# Patient Record
Sex: MALE | Race: WHITE | NOT HISPANIC OR LATINO | URBAN - METROPOLITAN AREA
[De-identification: names, ages, dates, MRNs, and addresses within clinical notes are randomized per-mention and may not be internally consistent; named-entity substitution may affect disease eponyms.]

---

## 2017-04-01 ENCOUNTER — INPATIENT (INPATIENT)
Facility: HOSPITAL | Age: 82
LOS: 3 days | Discharge: HOME | DRG: 66 | End: 2017-04-05
Payer: MEDICARE

## 2017-04-01 VITALS — HEIGHT: 70 IN | WEIGHT: 179.9 LBS

## 2017-04-01 DIAGNOSIS — I10 ESSENTIAL (PRIMARY) HYPERTENSION: ICD-10-CM

## 2017-04-01 DIAGNOSIS — G45.9 TRANSIENT CEREBRAL ISCHEMIC ATTACK, UNSPECIFIED: ICD-10-CM

## 2017-04-01 DIAGNOSIS — Z98.890 OTHER SPECIFIED POSTPROCEDURAL STATES: Chronic | ICD-10-CM

## 2017-04-01 DIAGNOSIS — G45.1 CAROTID ARTERY SYNDROME (HEMISPHERIC): ICD-10-CM

## 2017-04-01 DIAGNOSIS — Z95.5 PRESENCE OF CORONARY ANGIOPLASTY IMPLANT AND GRAFT: ICD-10-CM

## 2017-04-01 DIAGNOSIS — J45.909 UNSPECIFIED ASTHMA, UNCOMPLICATED: ICD-10-CM

## 2017-04-01 LAB
ALBUMIN SERPL ELPH-MCNC: 3.5 G/DL — SIGNIFICANT CHANGE UP (ref 3.3–5.2)
ALP SERPL-CCNC: 46 U/L — SIGNIFICANT CHANGE UP (ref 40–120)
ALT FLD-CCNC: 10 U/L — SIGNIFICANT CHANGE UP
ANION GAP SERPL CALC-SCNC: 7 MMOL/L — SIGNIFICANT CHANGE UP (ref 5–17)
APTT BLD: 32 SEC — SIGNIFICANT CHANGE UP (ref 27.5–37.4)
AST SERPL-CCNC: 18 U/L — SIGNIFICANT CHANGE UP
BASOPHILS # BLD AUTO: 0 K/UL — SIGNIFICANT CHANGE UP (ref 0–0.2)
BASOPHILS NFR BLD AUTO: 0.5 % — SIGNIFICANT CHANGE UP (ref 0–2)
BILIRUB SERPL-MCNC: 1.1 MG/DL — SIGNIFICANT CHANGE UP (ref 0.4–2)
BUN SERPL-MCNC: 18 MG/DL — SIGNIFICANT CHANGE UP (ref 8–20)
CALCIUM SERPL-MCNC: 8.6 MG/DL — SIGNIFICANT CHANGE UP (ref 8.6–10.2)
CHLORIDE SERPL-SCNC: 107 MMOL/L — SIGNIFICANT CHANGE UP (ref 98–107)
CO2 SERPL-SCNC: 30 MMOL/L — HIGH (ref 22–29)
CREAT SERPL-MCNC: 0.97 MG/DL — SIGNIFICANT CHANGE UP (ref 0.5–1.3)
EOSINOPHIL # BLD AUTO: 0.5 K/UL — SIGNIFICANT CHANGE UP (ref 0–0.5)
EOSINOPHIL NFR BLD AUTO: 7.7 % — HIGH (ref 0–5)
GLUCOSE SERPL-MCNC: 103 MG/DL — SIGNIFICANT CHANGE UP (ref 70–115)
HCT VFR BLD CALC: 42.6 % — SIGNIFICANT CHANGE UP (ref 42–52)
HGB BLD-MCNC: 14.7 G/DL — SIGNIFICANT CHANGE UP (ref 14–18)
INR BLD: 1.12 RATIO — SIGNIFICANT CHANGE UP (ref 0.88–1.16)
LYMPHOCYTES # BLD AUTO: 2 K/UL — SIGNIFICANT CHANGE UP (ref 1–4.8)
LYMPHOCYTES # BLD AUTO: 30 % — SIGNIFICANT CHANGE UP (ref 20–55)
MCHC RBC-ENTMCNC: 33.3 PG — HIGH (ref 27–31)
MCHC RBC-ENTMCNC: 34.5 G/DL — SIGNIFICANT CHANGE UP (ref 32–36)
MCV RBC AUTO: 96.6 FL — HIGH (ref 80–94)
MONOCYTES # BLD AUTO: 0.7 K/UL — SIGNIFICANT CHANGE UP (ref 0–0.8)
MONOCYTES NFR BLD AUTO: 10 % — SIGNIFICANT CHANGE UP (ref 3–10)
NEUTROPHILS # BLD AUTO: 3.4 K/UL — SIGNIFICANT CHANGE UP (ref 1.8–8)
NEUTROPHILS NFR BLD AUTO: 51.6 % — SIGNIFICANT CHANGE UP (ref 37–73)
PLATELET # BLD AUTO: 164 K/UL — SIGNIFICANT CHANGE UP (ref 150–400)
POTASSIUM SERPL-MCNC: 4.6 MMOL/L — SIGNIFICANT CHANGE UP (ref 3.5–5.3)
POTASSIUM SERPL-SCNC: 4.6 MMOL/L — SIGNIFICANT CHANGE UP (ref 3.5–5.3)
PROT SERPL-MCNC: 6.8 G/DL — SIGNIFICANT CHANGE UP (ref 6.6–8.7)
PROTHROM AB SERPL-ACNC: 12.4 SEC — SIGNIFICANT CHANGE UP (ref 9.8–12.7)
RBC # BLD: 4.41 M/UL — LOW (ref 4.6–6.2)
RBC # FLD: 13.2 % — SIGNIFICANT CHANGE UP (ref 11–15.6)
SODIUM SERPL-SCNC: 144 MMOL/L — SIGNIFICANT CHANGE UP (ref 135–145)
TROPONIN T SERPL-MCNC: <0.01 NG/ML — SIGNIFICANT CHANGE UP (ref 0–0.06)
WBC # BLD: 6.6 K/UL — SIGNIFICANT CHANGE UP (ref 4.8–10.8)
WBC # FLD AUTO: 6.6 K/UL — SIGNIFICANT CHANGE UP (ref 4.8–10.8)

## 2017-04-01 PROCEDURE — 99223 1ST HOSP IP/OBS HIGH 75: CPT | Mod: AI

## 2017-04-01 PROCEDURE — 99285 EMERGENCY DEPT VISIT HI MDM: CPT

## 2017-04-01 PROCEDURE — 93306 TTE W/DOPPLER COMPLETE: CPT | Mod: 26

## 2017-04-01 PROCEDURE — 93010 ELECTROCARDIOGRAM REPORT: CPT

## 2017-04-01 PROCEDURE — 70450 CT HEAD/BRAIN W/O DYE: CPT | Mod: 26

## 2017-04-01 RX ORDER — ENOXAPARIN SODIUM 100 MG/ML
40 INJECTION SUBCUTANEOUS EVERY 24 HOURS
Qty: 0 | Refills: 0 | Status: DISCONTINUED | OUTPATIENT
Start: 2017-04-01 | End: 2017-04-05

## 2017-04-01 RX ORDER — ASPIRIN/CALCIUM CARB/MAGNESIUM 324 MG
325 TABLET ORAL DAILY
Qty: 0 | Refills: 0 | Status: DISCONTINUED | OUTPATIENT
Start: 2017-04-01 | End: 2017-04-04

## 2017-04-01 RX ORDER — ACETAMINOPHEN 500 MG
650 TABLET ORAL EVERY 6 HOURS
Qty: 0 | Refills: 0 | Status: DISCONTINUED | OUTPATIENT
Start: 2017-04-01 | End: 2017-04-05

## 2017-04-01 RX ORDER — SIMVASTATIN 20 MG/1
40 TABLET, FILM COATED ORAL AT BEDTIME
Qty: 0 | Refills: 0 | Status: DISCONTINUED | OUTPATIENT
Start: 2017-04-01 | End: 2017-04-05

## 2017-04-01 RX ORDER — MONTELUKAST 4 MG/1
10 TABLET, CHEWABLE ORAL DAILY
Qty: 0 | Refills: 0 | Status: DISCONTINUED | OUTPATIENT
Start: 2017-04-01 | End: 2017-04-05

## 2017-04-01 RX ORDER — HYDRALAZINE HCL 50 MG
5 TABLET ORAL ONCE
Qty: 0 | Refills: 0 | Status: COMPLETED | OUTPATIENT
Start: 2017-04-01 | End: 2017-04-01

## 2017-04-01 RX ORDER — METOPROLOL TARTRATE 50 MG
25 TABLET ORAL DAILY
Qty: 0 | Refills: 0 | Status: DISCONTINUED | OUTPATIENT
Start: 2017-04-01 | End: 2017-04-03

## 2017-04-01 RX ADMIN — SIMVASTATIN 40 MILLIGRAM(S): 20 TABLET, FILM COATED ORAL at 22:19

## 2017-04-01 RX ADMIN — Medication 5 MILLIGRAM(S): at 19:55

## 2017-04-01 NOTE — H&P ADULT - ASSESSMENT
83 yr old male with CAD s/p stent, hypertension, carotid endarterectomy admitted with left arm weakness and word finding difficulty, CT brain no acute stroke. Labs noted. 83 yr old male with CAD s/p stent, hypertension, carotid endarterectomy admitted with right arm weakness and word finding difficulty, CT brain no acute stroke. Labs noted.

## 2017-04-01 NOTE — ED PROVIDER NOTE - NS ED ROS FT
Review of Systems:  	•	CONSTITUTIONAL : no fever or weight change  	•	SKIN : no rash  	•	HEMATOLOGIC : no petechia, no bruising  	•	EYES : no eye pain, no blurred vision  	•	ENT : no change in hearing, no sore throat  	•	RESPIRATORY : hx of asthma recently had singular added to meds  	•	CARDIAC : no chest pain, no palpitations  	•	GI : no abd pain, no nausea, no vomiting, no diarrhea, no constipation, no bleeding   	•	MUSCULOSKELETAL : no joint paint, no swelling, no redness  	•	NEUROLOGIC : + right arm motor weakness and garbled speech

## 2017-04-01 NOTE — ED STATDOCS - PROGRESS NOTE DETAILS
82 y/o male presents to ED c/o sudden onset weakness and "uncontrollability" of his right arm that occurred 2 hours PTA, subsequently followed by slurred speech. Denies HA. Last known well: 1500, TOA in ED: 1700. Wife gave pt ASA at onset of symptoms. Pt also reports dizziness that occurred yesterday while playing volleyball, which resolved. Code Stroke activated in ED. Pt to be moved to the resuscitation room for complete assessment and management by another provider. 84 y/o male presents to ED c/o sudden onset weakness and clumsiness of his right arm that occurred 2 hours PTA, subsequently followed by slurred speech. Denies HA. Last known well: 1500, TOA in ED: 1700. Now back to normal.  Wife gave pt ASA at onset of symptoms. Pt also reports dizziness that occurred yesterday while playing volleyball, which resolved. Code Stroke activated in ED. Pt to be moved to the resuscitation room for complete assessment and management by another provider.

## 2017-04-01 NOTE — H&P ADULT - HISTORY OF PRESENT ILLNESS
83 yr old male with hypertension, carotid endarterectomy, CAD s/p stent admitted with left arm weakness started at 3 pm. States that he was doing ok until then when he noted the weakness and was unable to use both hands to shave. Shortly after that he took a shower, after which wife felt his speech was not clear and he wasn't making any sense, hence she gave him a full dose aspirin and came in to ED. En route to ED he was noted to be normal and while waiting in the ED he was noted to have difficulty speaking for about 10 secs. Not a candidate for tPA as per tele neurology . He reports feeling dizzy yesterday, no chest pain, shortness of breath. Currently no deficits. 83 yr old male with hypertension, carotid endarterectomy, CAD s/p stent admitted with right arm weakness started at 3 pm. States that he was doing ok until then when he noted the weakness and was unable to use both hands to shave. Shortly after that he took a shower, after which wife felt his speech was not clear and he wasn't making any sense, hence she gave him a full dose aspirin and came in to ED. En route to ED he was noted to be normal and while waiting in the ED he was noted to have difficulty speaking for about 10 secs. Not a candidate for tPA as per tele neurology . He reports feeling dizzy yesterday, no chest pain, shortness of breath. Currently no deficits.

## 2017-04-01 NOTE — ED ADULT NURSE NOTE - CHIEF COMPLAINT QUOTE
pt presents to ED with rigth UE weakness and slurred speech started two hours ago, now resolving. + b/l equal motor strength noted to upper extremites no loss of sensation noted. denies headache .no facial droop noted.  dr alcantara called to bedside for immediate valuation. pt brought to intake 4.

## 2017-04-01 NOTE — ED ADULT NURSE NOTE - CHPI ED SYMPTOMS NEG
no confusion/no nausea/no fever/no vomiting/no change in level of consciousness/no loss of consciousness/no blurred vision/no dizziness

## 2017-04-01 NOTE — ED PROVIDER NOTE - OBJECTIVE STATEMENT
83 year old male presents to the ED stating that around 3:00 or so had trouble holding onto anything with his right arm and it felt "floppy" per wife and he had trouble expressing his words. Lasted about 15 minutes and seemed to have resolved. His wife gave him ecotrin.  Pt has had cardiac stents, is on ecotrin and also bilateral carotid endarterectomies.

## 2017-04-01 NOTE — STROKE CODE NOTE - PROBLEM SELECTOR PLAN 1
Plan:  - As he reports feeling back to normal with normal neurological exam, he would not benefit from being treated with IV tPA  - Aspirin orally or rectally   - MRI brain/MRA H/N  - Further work up and management as per the neurologist and [facility] ED MD    Above mentioned plan was discussed with patient and his wife in detail. All the questions were answered and concerns were addressed.

## 2017-04-01 NOTE — H&P ADULT - NEUROLOGICAL DETAILS
normal strength/sensation intact/responds to pain/responds to verbal commands/alert and oriented x 3

## 2017-04-01 NOTE — ED ADULT NURSE NOTE - OBJECTIVE STATEMENT
LAte entry : Pt brought in by family for possible stroke. As per wife , pt last well known approx 1500 , wife LAte entry : Pt brought in by family for possible stroke. As per wife , pt last well known approx 1500 , wife states " He had trouble speaking and words would not come out and his R hand was weak, he was unable to hold anything in his R hand"  Pt alert and oriented , speech clear , motor skill intact, pt moving all extremity well , no facial droop noted . Upon pt arrival to ED all symptoms resolved.  Pt seen and eval by MD , code stroke called, pt taken emergently to [facility]

## 2017-04-01 NOTE — H&P ADULT - RS GEN PE MLT RESP DETAILS PC
airway patent/breath sounds equal/respirations non-labored/good air movement/clear to auscultation bilaterally

## 2017-04-01 NOTE — ED PROVIDER NOTE - PROGRESS NOTE DETAILS
Spoke to Rafaela who is aware and will admit for TIA workup. Pt had another brief period of difficulty word finding lasting few seconds that resolved rapidly. Stoke telemonitored in progress.

## 2017-04-01 NOTE — STROKE CODE NOTE - ASSESSMENT/PLAN
84 Y/O man with multiple vascular risk factors is evaluated through tele-stroke for acute onset of R arm weakness and dysarthria with complete resolution of his symptoms. His neurological exam is non focal at this time. CT brain did not show any acute intracranial pathology.     Impression: L hemispheric transient dysfunction leading to L MCA distribution TIA – likely etiology being small vessel disease vs. cerebral embolism without cerebral infarction.

## 2017-04-01 NOTE — ED PROVIDER NOTE - MEDICAL DECISION MAKING DETAILS
Pt presents with TIA symptoms that have resolved with an NIH stroke scale of 0 now. Will get CT, neuro consult and admission.

## 2017-04-01 NOTE — STROKE CODE NOTE - SUBJECTIVE
84 Y/O man with multiple vascular risk factors was last normal at around 3:00 PM, when he had acute onset R arm weakness, numbness and dysarthria – which lasted for about 15-20 minutes with spontaneous improvement in the neurological deficits. About few minutes ago, he had another episode of dysarthria lasting for few seconds with complete resolution of the symptoms.

## 2017-04-02 LAB
ANION GAP SERPL CALC-SCNC: 9 MMOL/L — SIGNIFICANT CHANGE UP (ref 5–17)
APPEARANCE UR: CLEAR — SIGNIFICANT CHANGE UP
BACTERIA # UR AUTO: ABNORMAL
BASOPHILS # BLD AUTO: 0 K/UL — SIGNIFICANT CHANGE UP (ref 0–0.2)
BASOPHILS NFR BLD AUTO: 0.4 % — SIGNIFICANT CHANGE UP (ref 0–2)
BILIRUB UR-MCNC: NEGATIVE — SIGNIFICANT CHANGE UP
BUN SERPL-MCNC: 15 MG/DL — SIGNIFICANT CHANGE UP (ref 8–20)
CALCIUM SERPL-MCNC: 8.3 MG/DL — LOW (ref 8.6–10.2)
CHLORIDE SERPL-SCNC: 108 MMOL/L — HIGH (ref 98–107)
CHOLEST SERPL-MCNC: 135 MG/DL — SIGNIFICANT CHANGE UP (ref 110–199)
CO2 SERPL-SCNC: 25 MMOL/L — SIGNIFICANT CHANGE UP (ref 22–29)
COLOR SPEC: YELLOW — SIGNIFICANT CHANGE UP
CREAT SERPL-MCNC: 0.79 MG/DL — SIGNIFICANT CHANGE UP (ref 0.5–1.3)
DIFF PNL FLD: ABNORMAL
EOSINOPHIL # BLD AUTO: 0.6 K/UL — HIGH (ref 0–0.5)
EOSINOPHIL NFR BLD AUTO: 8 % — HIGH (ref 0–5)
GLUCOSE SERPL-MCNC: 94 MG/DL — SIGNIFICANT CHANGE UP (ref 70–115)
GLUCOSE UR QL: NEGATIVE MG/DL — SIGNIFICANT CHANGE UP
HCT VFR BLD CALC: 39.1 % — LOW (ref 42–52)
HDLC SERPL-MCNC: 37 MG/DL — LOW
HGB BLD-MCNC: 13.4 G/DL — LOW (ref 14–18)
INR BLD: 1.16 RATIO — SIGNIFICANT CHANGE UP (ref 0.88–1.16)
KETONES UR-MCNC: NEGATIVE — SIGNIFICANT CHANGE UP
LEUKOCYTE ESTERASE UR-ACNC: ABNORMAL
LIPID PNL WITH DIRECT LDL SERPL: 76 MG/DL — SIGNIFICANT CHANGE UP
LYMPHOCYTES # BLD AUTO: 1.7 K/UL — SIGNIFICANT CHANGE UP (ref 1–4.8)
LYMPHOCYTES # BLD AUTO: 24.4 % — SIGNIFICANT CHANGE UP (ref 20–55)
MAGNESIUM SERPL-MCNC: 1.9 MG/DL — SIGNIFICANT CHANGE UP (ref 1.8–2.5)
MCHC RBC-ENTMCNC: 32.8 PG — HIGH (ref 27–31)
MCHC RBC-ENTMCNC: 34.3 G/DL — SIGNIFICANT CHANGE UP (ref 32–36)
MCV RBC AUTO: 95.8 FL — HIGH (ref 80–94)
MONOCYTES # BLD AUTO: 0.6 K/UL — SIGNIFICANT CHANGE UP (ref 0–0.8)
MONOCYTES NFR BLD AUTO: 9.1 % — SIGNIFICANT CHANGE UP (ref 3–10)
NEUTROPHILS # BLD AUTO: 4 K/UL — SIGNIFICANT CHANGE UP (ref 1.8–8)
NEUTROPHILS NFR BLD AUTO: 57.8 % — SIGNIFICANT CHANGE UP (ref 37–73)
NITRITE UR-MCNC: NEGATIVE — SIGNIFICANT CHANGE UP
PH UR: 6 — SIGNIFICANT CHANGE UP (ref 4.8–8)
PHOSPHATE SERPL-MCNC: 2.4 MG/DL — SIGNIFICANT CHANGE UP (ref 2.4–4.7)
PLATELET # BLD AUTO: 147 K/UL — LOW (ref 150–400)
POTASSIUM SERPL-MCNC: 3.9 MMOL/L — SIGNIFICANT CHANGE UP (ref 3.5–5.3)
POTASSIUM SERPL-SCNC: 3.9 MMOL/L — SIGNIFICANT CHANGE UP (ref 3.5–5.3)
PROT UR-MCNC: NEGATIVE MG/DL — SIGNIFICANT CHANGE UP
PROTHROM AB SERPL-ACNC: 12.8 SEC — HIGH (ref 9.8–12.7)
RBC # BLD: 4.08 M/UL — LOW (ref 4.6–6.2)
RBC # FLD: 13.2 % — SIGNIFICANT CHANGE UP (ref 11–15.6)
RBC CASTS # UR COMP ASSIST: SIGNIFICANT CHANGE UP /HPF (ref 0–4)
SODIUM SERPL-SCNC: 142 MMOL/L — SIGNIFICANT CHANGE UP (ref 135–145)
SP GR SPEC: 1.02 — SIGNIFICANT CHANGE UP (ref 1.01–1.02)
TOTAL CHOLESTEROL/HDL RATIO MEASUREMENT: 4 RATIO — SIGNIFICANT CHANGE UP (ref 3.4–9.6)
TRIGL SERPL-MCNC: 109 MG/DL — SIGNIFICANT CHANGE UP (ref 10–200)
UROBILINOGEN FLD QL: NEGATIVE MG/DL — SIGNIFICANT CHANGE UP
WBC # BLD: 6.8 K/UL — SIGNIFICANT CHANGE UP (ref 4.8–10.8)
WBC # FLD AUTO: 6.8 K/UL — SIGNIFICANT CHANGE UP (ref 4.8–10.8)
WBC UR QL: ABNORMAL

## 2017-04-02 PROCEDURE — 99232 SBSQ HOSP IP/OBS MODERATE 35: CPT

## 2017-04-02 PROCEDURE — 93880 EXTRACRANIAL BILAT STUDY: CPT | Mod: 26

## 2017-04-02 RX ORDER — AMLODIPINE BESYLATE 2.5 MG/1
2.5 TABLET ORAL DAILY
Qty: 0 | Refills: 0 | Status: DISCONTINUED | OUTPATIENT
Start: 2017-04-02 | End: 2017-04-04

## 2017-04-02 RX ADMIN — Medication 650 MILLIGRAM(S): at 10:21

## 2017-04-02 RX ADMIN — Medication 650 MILLIGRAM(S): at 20:35

## 2017-04-02 RX ADMIN — ENOXAPARIN SODIUM 40 MILLIGRAM(S): 100 INJECTION SUBCUTANEOUS at 21:53

## 2017-04-02 RX ADMIN — Medication 325 MILLIGRAM(S): at 12:33

## 2017-04-02 RX ADMIN — MONTELUKAST 10 MILLIGRAM(S): 4 TABLET, CHEWABLE ORAL at 12:33

## 2017-04-02 RX ADMIN — Medication 25 MILLIGRAM(S): at 06:29

## 2017-04-02 RX ADMIN — SIMVASTATIN 40 MILLIGRAM(S): 20 TABLET, FILM COATED ORAL at 21:53

## 2017-04-02 RX ADMIN — Medication 650 MILLIGRAM(S): at 11:38

## 2017-04-02 RX ADMIN — Medication 650 MILLIGRAM(S): at 21:54

## 2017-04-02 NOTE — ED ADULT NURSE REASSESSMENT NOTE - NURSING NEURO ORIENTATION
oriented to person, place and time

## 2017-04-02 NOTE — ED ADULT NURSE REASSESSMENT NOTE - GENERAL PATIENT STATE
comfortable appearance/family/SO at bedside
family/SO at bedside/comfortable appearance
comfortable appearance/family/SO at bedside/cooperative

## 2017-04-02 NOTE — ED ADULT NURSE REASSESSMENT NOTE - NIH STROKE SCALE: 8. SENSORY, QM
(1) Mild-to-moderate sensory loss; patient feels pinprick is less sharp or is dull on the affected side; or there is a loss of superficial pain with pinprick, but patient is aware of being touched
(0) Normal; no sensory loss
(0) Normal; no sensory loss

## 2017-04-02 NOTE — CONSULT NOTE ADULT - ASSESSMENT
The patient is a 83y Male with TIA, somewhat a stuttering course.  Suggest MRI brain and MRA head and neck  ASA 81 daily,   will follow with you    Gigi Franz MD PhD   [id number]

## 2017-04-02 NOTE — PROGRESS NOTE ADULT - ASSESSMENT
83 yr old male with CAD s/p stent, hypertension, carotid endarterectomy admitted with right arm weakness and word finding difficulty, CT brain no acute stroke. He was evaluated by tele neurology on presentation and no tPA was given.

## 2017-04-02 NOTE — ED ADULT NURSE REASSESSMENT NOTE - NIH STROKE SCALE: 9. BEST LANGUAGE, QM
(1) Mild-to-moderate aphasia; some obvious loss of fluency or facility of comprehension, w/o significant limitation on ideas expressed or form of expression. Reduction of speech and/or comprehension, however, makes conversation about provided material difficult or impossible.  For example, in conversation about provided materials, examiner can identify picture or naming card content from patient's response.
(0) No aphasia; normal
(0) No aphasia; normal

## 2017-04-02 NOTE — ED ADULT NURSE REASSESSMENT NOTE - NURSING NEURO LEVEL OF CONSCIOUSNESS
alert and awake/follows commands
alert and awake/follows commands
follows commands
alert and awake
alert and awake

## 2017-04-02 NOTE — PROGRESS NOTE ADULT - SUBJECTIVE AND OBJECTIVE BOX
INTERVAL HPI/OVERNIGHT EVENTS:    CC: TIA    No overnight events, wife and daughter at bedside. No speech disturbance. Denies right hand weakness. No headache.    Vital Signs Last 24 Hrs  T(C): 36.7, Max: 36.8 (04-01 @ 17:32)  T(F): 98, Max: 98.2 (04-01 @ 17:32)  HR: 50 (45 - 55)  BP: 152/72 (141/55 - 216/96)  BP(mean): --  RR: 20 (18 - 20)  SpO2: 97% (95% - 99%)    PHYSICAL EXAM:    GENERAL: not in distress, alert  CHEST/LUNG: b/l air entry  HEART: bradycardia  ABDOMEN: Soft, BS+  EXTREMITIES:  No edema  Neuro: AOx3, no weakness, speech intact    MEDICATIONS  (STANDING):  enoxaparin Injectable 40milliGRAM(s) SubCutaneous every 24 hours  aspirin enteric coated 325milliGRAM(s) Oral daily  simvastatin 40milliGRAM(s) Oral at bedtime  metoprolol succinate ER 25milliGRAM(s) Oral daily  montelukast 10milliGRAM(s) Oral daily    MEDICATIONS  (PRN):  acetaminophen   Tablet 650milliGRAM(s) Oral every 6 hours PRN For Temp greater than 38 C (100.4 F)  acetaminophen   Tablet. 650milliGRAM(s) Oral every 6 hours PRN Moderate Pain (4 - 6)      Allergies    No Known Allergies    Intolerances          LABS:                          13.4   6.8   )-----------( 147      ( 02 Apr 2017 07:19 )             39.1     02 Apr 2017 07:19    142    |  108    |  15.0   ----------------------------<  94     3.9     |  25.0   |  0.79     Ca    8.3        02 Apr 2017 07:19  Phos  2.4       02 Apr 2017 07:19  Mg     1.9       02 Apr 2017 07:19    TPro  6.8    /  Alb  3.5    /  TBili  1.1    /  DBili  x      /  AST  18     /  ALT  10     /  AlkPhos  46     01 Apr 2017 17:11    PT/INR - ( 02 Apr 2017 07:19 )   PT: 12.8 sec;   INR: 1.16 ratio         PTT - ( 01 Apr 2017 17:11 )  PTT:32.0 sec      RADIOLOGY & ADDITIONAL TESTS:

## 2017-04-02 NOTE — PATIENT PROFILE ADULT. - VISION (WITH CORRECTIVE LENSES IF THE PATIENT USUALLY WEARS THEM):
just reading glasses/Normal vision: sees adequately in most situations; can see medication labels, newsprint

## 2017-04-02 NOTE — ED ADULT NURSE REASSESSMENT NOTE - NS ED NURSE REASSESS COMMENT FT1
EICU Neurologist, Regine Villanueva at bedside via Telestroke. MD Moon at bedside for exam.
MD aware of pt's blood pressure, no new orders at this time.
Per Dr Luna, Pt may go off monitor to test and will be downgraded to any monitored bed.
Per Neurologist - no tPA indicated for patient at this time. Going forward, if patient to experience any new onset of weakness, numbness or difficulty speaking, MD to evaluate for further treatment.
Pt returned from CT scan at this time, sinus estefanía on cardiac monitor, EKG completed and given to Dr. Moon. Per MD, no EICU indicated at this time as patients NIH is a 0. Pt denies any complaints at this time. Breathing even and unlabored. Son Hakeem and Wife Mary at bedside. Will continue to monitor and reassess.
RN and MD called to bedside for patient having new onset difficulty speaking. Pt with approx 20second episode of garbled speech and expressive aphasia. Neurologist paged. Awaiting call back at this time
no complaints offered at this time, awaiting bed, VSS
patient in no distress accu-check 90, NIH scale changes noted with speech scored "1" and feeling numbness to forarm and upper hand, patient with good strength bilateral had trouble remembering items as per stroke protocol pictures- can identify but has to think and has trouble forming words at first try, family at bedside, dr Lozoya- Hospitalist aware patient slotted for SDU
patient resting comfortably, speech has improved NiH "0", son at bedside
Pt provided lunch tray tolerated well. Pt remains A & Ox3, respirations even & unlabored. Pt's family remains at bedside. Pt continues to wait for room assignment, aware of plan of care.
patient received at 1915, in stable condition MD aware of elevated BP and HR, 5mg hydralazine given at 1955, 2000 patient wife states that patient can not move right arm, patient states that he feels weak and can not control it, MD at bedside, stroke protocol testing in progress and no deficits noted, patient symptoms lasted less than 1 minute, patient able to Hold, Pull, Push with right arm/hand with good strength, patient followed commands no distress noted, NIH remains same at "0"
Assumed pt care at this time. Pt A & Ox3, respirations even & unlabored. Pt's wife and daughter at bedside. Pt offers no complaints. Waiting for bed assignment, aware of plan of care. Will continue to monitor.

## 2017-04-03 LAB — HBA1C BLD-MCNC: 5.2 % — SIGNIFICANT CHANGE UP (ref 4–5.6)

## 2017-04-03 PROCEDURE — 70551 MRI BRAIN STEM W/O DYE: CPT | Mod: 26,59

## 2017-04-03 PROCEDURE — 99233 SBSQ HOSP IP/OBS HIGH 50: CPT

## 2017-04-03 PROCEDURE — 70544 MR ANGIOGRAPHY HEAD W/O DYE: CPT | Mod: 26

## 2017-04-03 PROCEDURE — 70548 MR ANGIOGRAPHY NECK W/DYE: CPT | Mod: 26

## 2017-04-03 RX ORDER — DOCUSATE SODIUM 100 MG
100 CAPSULE ORAL
Qty: 0 | Refills: 0 | Status: DISCONTINUED | OUTPATIENT
Start: 2017-04-03 | End: 2017-04-05

## 2017-04-03 RX ADMIN — Medication 650 MILLIGRAM(S): at 20:40

## 2017-04-03 RX ADMIN — Medication 325 MILLIGRAM(S): at 13:28

## 2017-04-03 RX ADMIN — MONTELUKAST 10 MILLIGRAM(S): 4 TABLET, CHEWABLE ORAL at 13:28

## 2017-04-03 RX ADMIN — AMLODIPINE BESYLATE 2.5 MILLIGRAM(S): 2.5 TABLET ORAL at 05:45

## 2017-04-03 RX ADMIN — SIMVASTATIN 40 MILLIGRAM(S): 20 TABLET, FILM COATED ORAL at 22:10

## 2017-04-03 RX ADMIN — ENOXAPARIN SODIUM 40 MILLIGRAM(S): 100 INJECTION SUBCUTANEOUS at 22:10

## 2017-04-03 RX ADMIN — Medication 650 MILLIGRAM(S): at 20:07

## 2017-04-03 RX ADMIN — Medication 100 MILLIGRAM(S): at 22:32

## 2017-04-03 NOTE — PROGRESS NOTE ADULT - SUBJECTIVE AND OBJECTIVE BOX
INTERVAL HPI/OVERNIGHT EVENTS:    CC: TIA, hypertension, carotid stenosis, bradycardia    No overnight events, denies weakness. No headache. Tele reviewed, noted to have HR in 40s, Toprol held this am.     Vital Signs Last 24 Hrs  T(C): 36.9, Max: 36.9 ( @ 12:24)  T(F): 98.4, Max: 98.4 ( @ 12:24)  HR: 43 (43 - 50)  BP: 138/68 (138/68 - 170/68)  BP(mean): --  RR: 16 (16 - 20)  SpO2: 97% (96% - 98%)    PHYSICAL EXAM:    GENERAL: Not in distress, alert  CHEST/LUNG: b/l air entry  HEART: bradycardia  ABDOMEN: Soft, BS+  EXTREMITIES: No edema    MEDICATIONS  (STANDING):  enoxaparin Injectable 40milliGRAM(s) SubCutaneous every 24 hours  aspirin enteric coated 325milliGRAM(s) Oral daily  simvastatin 40milliGRAM(s) Oral at bedtime  montelukast 10milliGRAM(s) Oral daily  amLODIPine   Tablet 2.5milliGRAM(s) Oral daily    MEDICATIONS  (PRN):  acetaminophen   Tablet 650milliGRAM(s) Oral every 6 hours PRN For Temp greater than 38 C (100.4 F)  acetaminophen   Tablet. 650milliGRAM(s) Oral every 6 hours PRN Moderate Pain (4 - 6)      Allergies    No Known Allergies    Intolerances          LABS:                          13.4   6.8   )-----------( 147      ( 2017 07:19 )             39.1     2017 07:19    142    |  108    |  15.0   ----------------------------<  94     3.9     |  25.0   |  0.79     Ca    8.3        2017 07:19  Phos  2.4       2017 07:19  Mg     1.9       2017 07:19    TPro  6.8    /  Alb  3.5    /  TBili  1.1    /  DBili  x      /  AST  18     /  ALT  10     /  AlkPhos  46     2017 17:11    PT/INR - ( 2017 07:19 )   PT: 12.8 sec;   INR: 1.16 ratio         PTT - ( 2017 17:11 )  PTT:32.0 sec  Urinalysis Basic - ( 2017 13:57 )    Color: Yellow / Appearance: Clear / S.020 / pH: x  Gluc: x / Ketone: Negative  / Bili: Negative / Urobili: Negative mg/dL   Blood: x / Protein: Negative mg/dL / Nitrite: Negative   Leuk Esterase: Trace / RBC: 0-2 /HPF / WBC 6-10   Sq Epi: x / Non Sq Epi: x / Bacteria: Occasional        RADIOLOGY & ADDITIONAL TESTS:

## 2017-04-03 NOTE — PROGRESS NOTE ADULT - ASSESSMENT
83 yr old male with CAD s/p stent, hypertension, carotid endarterectomy admitted with right arm weakness and word finding difficulty, CT brain no acute stroke. He was evaluated by tele neurology on presentation and no tPA was given. Carotid ultrasound revealed stenosis on left. MRI brain and MRA neck was ordered. Toprol held given bradycardia and cardiology consulted.

## 2017-04-03 NOTE — PROGRESS NOTE ADULT - SUBJECTIVE AND OBJECTIVE BOX
[facility]                                 [address]                                  [address]                                        [place], [place], [address]                                             [phone]        No new complaints.    Vital Signs Last 24 Hrs  T(F): 97.4, Max: 98 (04-02 @ 17:41)  HR: 43 (43 - 50)  BP: 166/68 (124/58 - 170/68)  RR: 16 (16 - 21)  SpO2: 98% (96% - 98%)    Awake and alert.  Pupils react.  Face symmetric.  Good strength bilaterally.

## 2017-04-04 DIAGNOSIS — R00.1 BRADYCARDIA, UNSPECIFIED: ICD-10-CM

## 2017-04-04 DIAGNOSIS — I63.9 CEREBRAL INFARCTION, UNSPECIFIED: ICD-10-CM

## 2017-04-04 DIAGNOSIS — I65.29 OCCLUSION AND STENOSIS OF UNSPECIFIED CAROTID ARTERY: ICD-10-CM

## 2017-04-04 PROCEDURE — 99233 SBSQ HOSP IP/OBS HIGH 50: CPT

## 2017-04-04 RX ORDER — AMLODIPINE BESYLATE 2.5 MG/1
5 TABLET ORAL DAILY
Qty: 0 | Refills: 0 | Status: DISCONTINUED | OUTPATIENT
Start: 2017-04-05 | End: 2017-04-05

## 2017-04-04 RX ORDER — CLOPIDOGREL BISULFATE 75 MG/1
75 TABLET, FILM COATED ORAL DAILY
Qty: 0 | Refills: 0 | Status: DISCONTINUED | OUTPATIENT
Start: 2017-04-04 | End: 2017-04-05

## 2017-04-04 RX ORDER — DIPHENHYDRAMINE HCL 50 MG
25 CAPSULE ORAL ONCE
Qty: 0 | Refills: 0 | Status: COMPLETED | OUTPATIENT
Start: 2017-04-04 | End: 2017-04-04

## 2017-04-04 RX ORDER — DOCUSATE SODIUM 100 MG
100 CAPSULE ORAL
Qty: 0 | Refills: 0 | Status: DISCONTINUED | OUTPATIENT
Start: 2017-04-04 | End: 2017-04-04

## 2017-04-04 RX ADMIN — Medication 325 MILLIGRAM(S): at 12:57

## 2017-04-04 RX ADMIN — AMLODIPINE BESYLATE 2.5 MILLIGRAM(S): 2.5 TABLET ORAL at 05:10

## 2017-04-04 RX ADMIN — Medication 100 MILLIGRAM(S): at 21:54

## 2017-04-04 RX ADMIN — CLOPIDOGREL BISULFATE 75 MILLIGRAM(S): 75 TABLET, FILM COATED ORAL at 16:38

## 2017-04-04 RX ADMIN — SIMVASTATIN 40 MILLIGRAM(S): 20 TABLET, FILM COATED ORAL at 21:54

## 2017-04-04 RX ADMIN — Medication 25 MILLIGRAM(S): at 23:14

## 2017-04-04 RX ADMIN — ENOXAPARIN SODIUM 40 MILLIGRAM(S): 100 INJECTION SUBCUTANEOUS at 21:54

## 2017-04-04 NOTE — DISCHARGE NOTE ADULT - PATIENT PORTAL LINK FT
“You can access the [facility] Patient Portal, offered by [facility], by registering with the following website: [email or web address]”

## 2017-04-04 NOTE — DISCHARGE NOTE ADULT - MEDICATION SUMMARY - MEDICATIONS TO CHANGE
I will SWITCH the dose or number of times a day I take the medications listed below when I get home from the hospital:  None I will SWITCH the dose or number of times a day I take the medications listed below when I get home from the hospital:    Ecotrin 325 mg oral delayed release tablet  -- 1 tab(s) by mouth once a day

## 2017-04-04 NOTE — DISCHARGE NOTE ADULT - CARE PROVIDER_API CALL
Miguel Canada), Cardiovascular Disease; Internal Medicine  [address]  [place] [address]  Phone: [phone]  Fax: [phone]    Karri Shepherd), Neurology  [address]  [place] [address]  Phone: [phone]  Fax: [phone] Miguel Canada (MD), Cardiovascular Disease; Internal Medicine  [address]  [place] [address]  Phone: [phone]  Fax: [phone]    Karri Shepherd), Neurology  [address]  [place] [address]  Phone: [phone]  Fax: [phone]    Krishan Pressley), Family Medicine  [address] [place] [address]  Phone: [phone]  Fax: [phone]

## 2017-04-04 NOTE — PROGRESS NOTE ADULT - ASSESSMENT
83 yr old male with CAD s/p stent, hypertension, carotid endarterectomy admitted with right arm weakness and word finding difficulty, CT brain no acute stroke. He was evaluated by tele neurology on presentation and no tPA was given. Carotid ultrasound revealed stenosis on left. MRI brain and MRA neck was ordered. Toprol held given bradycardia and cardiology consulted. No indication of PPM per cardiology. MRI brain consistent with acute stroke and MRA neck showed <50 % left carotid stenosis, hence vascular surgery consulted.

## 2017-04-04 NOTE — DISCHARGE NOTE ADULT - ADDITIONAL INSTRUCTIONS
Continue medications as instructed, follow up with PMD in 1 week and cardiology in 1-2 weeks. Return to ED for worsening symptoms.

## 2017-04-04 NOTE — CONSULT NOTE ADULT - SUBJECTIVE AND OBJECTIVE BOX
[facility], [facility]                                                    [address], [address], [place] [address]                                                         PHONE: [phone]    FAX: [phone] [address], [address], [place] [address]                                                 PHONE: [phone]    FAX: [phone]  *******************************************************************************    Reason for Consult: Bradycardia     HPI:  NITISH FELTON is a 83y old male with PMH of CAD/stent in remote past/HTN/PVD with carotid endarterectomy admitted with right arm weakness and word finding difficulty, CT of brain no acute stroke. Carotid ultrasound revealed stenosis on left. MRI brain and MRA neck was ordered. Patient was found to have HR in 40s, toprol was held.     PAST MEDICAL & SURGICAL HISTORY:  Stented coronary artery: x3  Hypertension  CAD (coronary artery disease)  Asthma  S/P hernia surgery  H/O carotid endarterectomy: BILATERAL      No Known Allergies      MEDICATIONS  (STANDING):  enoxaparin Injectable 40milliGRAM(s) SubCutaneous every 24 hours  aspirin enteric coated 325milliGRAM(s) Oral daily  simvastatin 40milliGRAM(s) Oral at bedtime  montelukast 10milliGRAM(s) Oral daily  amLODIPine   Tablet 2.5milliGRAM(s) Oral daily    MEDICATIONS  (PRN):  acetaminophen   Tablet 650milliGRAM(s) Oral every 6 hours PRN For Temp greater than 38 C (100.4 F)  acetaminophen   Tablet. 650milliGRAM(s) Oral every 6 hours PRN Moderate Pain (4 - 6)      Social History: no active tobacco / EtOH / IVDA    Family History: No pertinent family history in first degree relatives      ROS: As noted above, otherwise unremarkable.    Vital Signs Last 24 Hrs  T(C): 36.9, Max: 36.9 (04-03 @ 12:24)  T(F): 98.4, Max: 98.4 (04-03 @ 12:24)  HR: 43 (43 - 50)  BP: 138/68 (138/68 - 170/68)  BP(mean): --  RR: 16 (16 - 20)  SpO2: 97% (96% - 98%)    I&O's Detail  I & Os for 24h ending 03 Apr 2017 07:00  =============================================  IN:    Total IN: 0 ml  ---------------------------------------------  OUT:    Voided: 350 ml    Total OUT: 350 ml  ---------------------------------------------  Total NET: -350 ml    I & Os for current day (as of 03 Apr 2017 16:23)  =============================================  IN:    Oral Fluid: 360 ml    Total IN: 360 ml  ---------------------------------------------  OUT:    Total OUT: 0 ml  ---------------------------------------------  Total NET: 360 ml    I&O's Summary  I & Os for 24h ending 03 Apr 2017 07:00  =============================================  IN: 0 ml / OUT: 350 ml / NET: -350 ml    I & Os for current day (as of 03 Apr 2017 16:23)  =============================================  IN: 360 ml / OUT: 0 ml / NET: 360 ml          PHYSICAL EXAM:  General: Appears well developed, well nourished, no acute distress  HEENT: Head: normocephalic, atraumatic  Eyes: Pupils equal and reactive  Neck: Supple, no carotid bruit, no JVD, no HJR  CARDIOVASCULAR: Normal S1 and S2, no murmur, rub, or gallop  LUNGS: Clear to auscultation bilaterally, no rales, rhonchi or wheeze  ABDOMEN: Soft, nontender, non-distended, positive bowel sounds, no mass or bruit  EXTREMITIES: No edema, distal pulses WNL  SKIN: Warm and dry with normal turgor  NEURO: Alert & oriented x 3, grossly intact  PSYCH: normal mood and affect    LABS:                        13.4   6.8   )-----------( 147      ( 02 Apr 2017 07:19 )             39.1     02 Apr 2017 07:19    142    |  108    |  15.0   ----------------------------<  94     3.9     |  25.0   |  0.79     Ca    8.3        02 Apr 2017 07:19  Phos  2.4       02 Apr 2017 07:19  Mg     1.9       02 Apr 2017 07:19    TPro  6.8    /  Alb  3.5    /  TBili  1.1    /  DBili  x      /  AST  18     /  ALT  10     /  AlkPhos  46     01 Apr 2017 17:11    CARDIAC MARKERS ( 01 Apr 2017 17:11 )  x     / <0.01 ng/mL / x     / x     / x          PT/INR - ( 02 Apr 2017 07:19 )   PT: 12.8 sec;   INR: 1.16 ratio         PTT - ( 01 Apr 2017 17:11 )  PTT:32.0 sec    RADIOLOGY & ADDITIONAL STUDIES:    ECG: Sinus bradycardia with 1st degree A-V block  Possible Inferior infarct , age undetermined          Assessment and Plan:  In summary, NITISH FELTON is a 83y Male with past medical history significant for with PMH of CAD/stent in remote past/HTN/PVD with carotid endarterectomy admitted with TIA, found to be bradycardic secondary to Toprol. No clinical signes of  overt CHF/cardiac ischemia. Last stress test in 2016 with no ischemia, echo from 01/2017 with normal LV function, mild to moderate valvular abnormalities   - Monitor on telemetry  - Further work-up & testing (possible MRI, etc.) per neurology   - No evidence of ischemia or CHF clinically, eventual ischemic evaluation as an outpatient  - Rhythm/hemodynamics stable, avoid all AV verona blocking meds  - No clear cut indication for PPM at present time      We will follow with you.  Thank you for allowing me to participate in the care of your patient.      Sincerely,
[facility]                                 [facility] [address], [address]                                  [address]                                        [place], [place], [address]                                             [phone]    HISTORY:    The patient is a 83y Male who presented with multiple episodes of dysarthria over the past day. Each episode lasted about 15-20 minutes.  Currently he has not had another episode today.  He also had paresthesia and a sense that his right arm would not do what he wanted it to do.  This too has resolved.  Neuro eval is requested.    PAST MEDICAL & SURGICAL HISTORY:  Stented coronary artery: x3  Hypertension  CAD (coronary artery disease)  Asthma  S/P hernia surgery  H/O carotid endarterectomy: BILATERAL      MEDICATIONS  (STANDING):  enoxaparin Injectable 40milliGRAM(s) SubCutaneous every 24 hours  aspirin enteric coated 325milliGRAM(s) Oral daily  simvastatin 40milliGRAM(s) Oral at bedtime  metoprolol succinate ER 25milliGRAM(s) Oral daily  montelukast 10milliGRAM(s) Oral daily  amLODIPine   Tablet 2.5milliGRAM(s) Oral daily    MEDICATIONS  (PRN):  acetaminophen   Tablet 650milliGRAM(s) Oral every 6 hours PRN For Temp greater than 38 C (100.4 F)  acetaminophen   Tablet. 650milliGRAM(s) Oral every 6 hours PRN Moderate Pain (4 - 6)      Allergies    No Known Allergies    Intolerances        SOCIAL HISTORY:  no sig EtOH. no drugs/tob    FAMILY HISTORY:  No pertinent family history in first degree relatives      ROS:  The patient denies fevers or weight changes.  Denies headache or dizziness.  Denies chest pain.  Denies shortness of breath.  Denies abdominal pain, nausea, or vomiting.  Denies change in urinary pattern.  Denies rash.  Denies recent mood changes.    Exam:  Vital Signs Last 24 Hrs  T(C): 36.7, Max: 36.8 (04-01 @ 17:32)  T(F): 98, Max: 98.2 (04-01 @ 17:32)  HR: 48 (45 - 55)  BP: 124/58 (124/58 - 216/96)  BP(mean): --  RR: 21 (18 - 21)  SpO2: 97% (95% - 99%)  General: NAD    Mental status: The patient is awake, alert, and fully oriented. There is no aphasia.    Cranial nerves: . Pupils react Symmetrically to light. There is no visual field deficit to confrontation. Extraocular motion is full with no nystagmus. There is no ptosis. Facial sensation is intact. Facial musculature is symmetric. Palate elevates symmetrically. Tongue is midline.    Motor: There is normal bulk and tone.  Strength is 5/5 in the right arm and leg.   Strength is 5/5 in the left arm and leg.    Sensation: Intact to light touch and pin.    Reflexes: 2+ throughout and plantar responses are flexor.    Cerebellar: There is no dysmetria on finger to nose testing.    LABS:                         13.4   6.8   )-----------( 147      ( 02 Apr 2017 07:19 )             39.1       02 Apr 2017 07:19    142    |  108    |  15.0   ----------------------------<  94     3.9     |  25.0   |  0.79     Ca    8.3        02 Apr 2017 07:19  Phos  2.4       02 Apr 2017 07:19  Mg     1.9       02 Apr 2017 07:19    TPro  6.8    /  Alb  3.5    /  TBili  1.1    /  DBili  x      /  AST  18     /  ALT  10     /  AlkPhos  46     01 Apr 2017 17:11      PT/INR - ( 02 Apr 2017 07:19 )   PT: 12.8 sec;   INR: 1.16 ratio         PTT - ( 01 Apr 2017 17:11 )  PTT:32.0 sec    RADIOLOGY & ADDITIONAL STUDIES:  head CT no stroke, mass or bleed  carotid duplex 50-69% stenosis left carotid bulb/proximal ICA
Vascular Attending:  Dr Lemus      HPI:  83 yr old male with hypertension, carotid endarterectomy, CAD s/p stent admitted with right arm weakness started at 3 pm. States that he was doing ok until then when he noted the weakness and was unable to use both hands to shave. Shortly after that he took a shower, after which wife felt his speech was not clear and he wasn't making any sense, hence she gave him a full dose aspirin and came in to ED. En route to ED he was noted to be normal and while waiting in the ED he was noted to have difficulty speaking for about 10 secs. Not a candidate for tPA as per tele neurology . He reports feeling dizzy yesterday, no chest pain, shortness of breath. Currently no deficits. (2017 19:36)      PAST MEDICAL & SURGICAL HISTORY:  Stented coronary artery: x3  Hypertension  CAD (coronary artery disease)  Asthma  S/P hernia surgery  H/O carotid endarterectomy: BILATERAL      REVIEW OF SYSTEMS  Neurological: dizziness, RUE weakness, difficulty with word finding	  Respiratory and Thorax: denies SOB or cough	  Cardiovascular:	denies CP or palps  Gastrointestinal:	 denies N/V    MEDICATIONS  (STANDING):  enoxaparin Injectable 40milliGRAM(s) SubCutaneous every 24 hours  aspirin enteric coated 325milliGRAM(s) Oral daily  simvastatin 40milliGRAM(s) Oral at bedtime  montelukast 10milliGRAM(s) Oral daily  amLODIPine   Tablet 2.5milliGRAM(s) Oral daily  docusate sodium 100milliGRAM(s) Oral two times a day    MEDICATIONS  (PRN):  acetaminophen   Tablet 650milliGRAM(s) Oral every 6 hours PRN For Temp greater than 38 C (100.4 F)  acetaminophen   Tablet. 650milliGRAM(s) Oral every 6 hours PRN Moderate Pain (4 - 6)      Allergies:No Known Allergies    SOCIAL HISTORY: , nonsmoker      Vital Signs Last 24 Hrs  T(C): 36.1, Max: 36.9 (04-03 @ 20:20)  T(F): 97, Max: 98.4 (04-03 @ 20:20)  HR: 48 (48 - 50)  BP: 133/67 (133/67 - 158/70)  BP(mean): --  RR: 17 (16 - 20)  SpO2: 95% (95% - 97%)    PHYSICAL EXAM:  Constitutional: WD, WN male in NAD. Speech fluent, face symmetric, ROTHMAN x 4 =  Neck: Supple, no JVD, Bilat well healed CEA incisions, no carotid bruits appreciated  Respiratory: CTA  Cardiovascular: normal S1, S2  Gastrointestinal: Soft, ND, NT, +BS  LABS:    Urinalysis Basic - ( 2017 13:57 )    Color: Yellow / Appearance: Clear / S.020 / pH: x  Gluc: x / Ketone: Negative  / Bili: Negative / Urobili: Negative mg/dL   Blood: x / Protein: Negative mg/dL / Nitrite: Negative   Leuk Esterase: Trace / RBC: 0-2 /HPF / WBC 6-10   Sq Epi: x / Non Sq Epi: x / Bacteria: Occasional        RADIOLOGY & ADDITIONAL STUDIES  EXAM:  CAROTID   NECK(MRA)W CON                          PROCEDURE DATE:  2017        INTERPRETATION:    CLINICAL HISTORY: TIA    TECHNIQUE: Contrast-enhanced MRA neck: 3-D time of flight imaging with 2D   MIP reconstructions. 10 cc Intravenous Gadavist was administered.    COMPARISON: Carotid ultrasound dated 2017    FINDINGS:  Left 50% stenosis in the left carotid bulb. Mild to moderate stenosis at   the origin of the left external carotid artery. Mild stenosis in the mid   to distal right common carotid artery.    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 There is normal relationship to   the great vessels. The common carotid arteries, internal carotid arteries   and vertebral arteries shows no other evidence of significant stenosis,   occlusion or saccular aneurysm dilation. The vertebral arteries are   codominant.      IMPRESSION:  Less than 50% stenosis in the left carotid bulb..     EXAM:  US DPLX CAROTIDS COMPL BI                          PROCEDURE DATE:  2017        INTERPRETATION:  EXAMINATION DATE: 2017 at 1934 hours.  CLINICAL INFORMATION: TIA. Right arm numbness, slurred speech. History of   bilateral endarterectomies.    TECHNIQUE: Carotid duplex examination using grayscale B mode, color flow,   and spectral Doppler.  COMPARISON: None.    Findings:    RIGHT --    Common carotid artery: Mild soft plaque in the mid/distal common carotid   artery. Peak systolic velocity 69 cm/sec.    Internal carotid artery: Mild heterogeneous plaque. Peak systolic   velocity 119 cm/sec. End-diastolic velocity 27 cm/sec.    ICA/CCA ratio: 1.8    External carotid artery: Peak systolic velocity 111 cm/sec.    Vertebral artery: Antegrade flow.    ====================================================================    LEFT --    Common carotid artery: Mild soft plaque in the common carotid artery.   Peak systolic velocity 87 cm/sec.    Internal carotid artery: Moderate soft plaque in the bulb. Peak systolic   velocity 200 cm/sec. End-diastolic velocity 11 cm/sec.    ICA/CCA ratio: 3.0    External carotid artery: Peak systolic velocity 336 cm/sec.    Vertebral artery: Antegrade flow.        IMPRESSION:    1.  Right carotid artery: No evidence of hemodynamically significant   stenosis.   2.  Left carotid artery: Approximately 50-69% stenosis in the left   carotid bulb and proximal internal carotid artery.  3.  Vertebral arteries: Antegrade flow.  4.  Other: Elevated velocities in the left external carotid artery.    EXAM:  BRAIN (MRI) W O CON                          PROCEDURE DATE:  2017        INTERPRETATION:  CLINICAL HISTORY: TIA, trouble speaking    COMPARISON: CT head dated 2017    TECHNIQUE: MRI brain: Multiplanar, multisequence MR imaging ofthe brain   are obtained without the administration of intravenous gadolinium.     FINDINGS:  There there are small areas of abnormal restricted diffusion in the   posterior left frontal lobe on images 21 and 22. Old small right frontal   lobe cortical infarct on image 25, series 6. Normal T2 flow-voids are   seen within  the intracranial vasculature. The lateral ventricles and   cortical sulci are age prior in size and configuration. There is no mass,   mass effect, or extra-axial fluid collection. There is no susceptibility   artifact to suggest hemorrhage. Midline structures are normal.     Moderate to severe polypoid inflammatory mucosal changes are seen   throughout the various portions of the paranasal sinuses. Small left   mastoid air cell effusion. The orbits and right mastoid air cells are   otherwise unremarkable.      IMPRESSION: Acute small posterior left frontal lobe cortical infarcts.     Impression and Plan:83 yr old male with hypertension, carotid endarterectomy, CAD s/p stent admitted with right arm weakness, speech disturbance and L frontal CVA  Current studies performed do not demonstrate sig stenosis, however would like CTA neck to further assess anatomy. If no sig stenosis on CTA then do not feel symptoms related to carotid disease  Would cont antiplatelets and statin  Further recommendations following testing  Seen with Dr Lemus

## 2017-04-04 NOTE — DISCHARGE NOTE ADULT - PLAN OF CARE
Avoid recurrent symptoms. Vascular surgery follow up. Stop Toprol, continue Amlodipine. Continue medications. Follow up with cardiology, stop Toprol Follow up with pulmonology. Continue Plavix and Amlodipine.

## 2017-04-04 NOTE — DISCHARGE NOTE ADULT - HOSPITAL COURSE
83 yr old male with CAD s/p stent, hypertension, carotid endarterectomy admitted with right arm weakness and word finding difficulty, CT brain no acute stroke. He was evaluated by tele neurology on presentation and no tPA was given. Carotid ultrasound revealed stenosis on left. MRI brain and MRA neck was ordered. Toprol held given bradycardia and cardiology consulted. No indication of PPM per cardiology. MRI brain consistent with acute stroke and MRA neck showed <50 % left carotid stenosis, hence vascular surgery consulted. CTA neck ordered. 83 yr old male with CAD s/p stent, hypertension, carotid endarterectomy admitted with right arm weakness and word finding difficulty, CT brain no acute stroke. He was evaluated by tele neurology on presentation and no tPA was given. Carotid ultrasound revealed stenosis on left. MRI brain and MRA neck was ordered. Toprol held given bradycardia and cardiology consulted. No indication of PPM per cardiology. MRI brain consistent with acute stroke and MRA neck showed <50 % left carotid stenosis, hence vascular surgery consulted. CTA neck ordered which did not show stenosis. Plavix was started given stroke, aspirin discontinued per neurology recommendations. He was evaluated by PT and is medically stable for discharge home. Advised close follow up with cardiology upon discharge. Plan of care and follow up discussed with patient and family.    Spent > 35 mins in discharge plan and documentation.

## 2017-04-04 NOTE — DISCHARGE NOTE ADULT - MEDICATION SUMMARY - MEDICATIONS TO TAKE
I will START or STAY ON the medications listed below when I get home from the hospital:    simvastatin 40 mg oral tablet  -- 1 tab(s) by mouth once a day (at bedtime)  -- Indication: For Stroke    clopidogrel 75 mg oral tablet  -- 1 tab(s) by mouth once a day  -- Indication: For Stroke    amLODIPine 5 mg oral tablet  -- 1 tab(s) by mouth once a day  -- Indication: For Hypertension    montelukast 10 mg oral tablet  -- 1 tab(s) by mouth once a day.  Follow up with your pulmonologist regarding continuing the same.  -- Indication: For Asthma I will START or STAY ON the medications listed below when I get home from the hospital:    aspirin 81 mg oral tablet  -- 1 tab(s) by mouth once a day  -- Indication: For Hypertension    simvastatin 40 mg oral tablet  -- 1 tab(s) by mouth once a day (at bedtime)  -- Indication: For Stroke    clopidogrel 75 mg oral tablet  -- 1 tab(s) by mouth once a day  -- Indication: For Stroke    amLODIPine 5 mg oral tablet  -- 1 tab(s) by mouth once a day  -- Indication: For Hypertension    montelukast 10 mg oral tablet  -- 1 tab(s) by mouth once a day.  Follow up with your pulmonologist regarding continuing the same.  -- Indication: For Asthma

## 2017-04-04 NOTE — DISCHARGE NOTE ADULT - NS AS DC STROKE ED MATERIALS
Stroke Warning Signs and Symptoms/Call 911 for Stroke/Risk Factors for Stroke/Stroke Education Booklet/Need for Followup After Discharge/Prescribed Medications

## 2017-04-04 NOTE — DISCHARGE NOTE ADULT - PROVIDER TOKENS
TOKEN:'[id number]:'[id number]' TOKEN:'[id number]:'[id number]:'[id number]'

## 2017-04-04 NOTE — PROGRESS NOTE ADULT - SUBJECTIVE AND OBJECTIVE BOX
[facility], [facility]                                                    [address], [place] [address]                                                         PHONE: [phone]    FAX: [phone] [address], [address], [place] [address]                                                 PHONE: [phone]    FAX: [phone]  *******************************************************************************    Overnight events/Subjective Assessment:    INTERPRETATION OF TELEMETRY (personally reviewed):    No Known Allergies    MEDICATIONS  (STANDING):  enoxaparin Injectable 40milliGRAM(s) SubCutaneous every 24 hours  aspirin enteric coated 325milliGRAM(s) Oral daily  simvastatin 40milliGRAM(s) Oral at bedtime  montelukast 10milliGRAM(s) Oral daily  amLODIPine   Tablet 2.5milliGRAM(s) Oral daily  docusate sodium 100milliGRAM(s) Oral two times a day  docusate sodium 100milliGRAM(s) Oral two times a day    MEDICATIONS  (PRN):  acetaminophen   Tablet 650milliGRAM(s) Oral every 6 hours PRN For Temp greater than 38 C (100.4 F)  acetaminophen   Tablet. 650milliGRAM(s) Oral every 6 hours PRN Moderate Pain (4 - 6)      Vital Signs Last 24 Hrs  T(C): 36.7, Max: 36.9 (04-03 @ 12:24)  T(F): 98, Max: 98.4 (04-03 @ 12:24)  HR: 48 (43 - 50)  BP: 158/70 (138/68 - 158/70)  BP(mean): --  RR: 18 (16 - 20)  SpO2: 95% (95% - 97%)    I&O's Detail    I & Os for current day (as of 04 Apr 2017 08:56)  =============================================  IN:    Oral Fluid: 720 ml    Total IN: 720 ml  ---------------------------------------------  OUT:    Voided: 1550 ml    Total OUT: 1550 ml  ---------------------------------------------  Total NET: -830 ml    I&O's Summary    I & Os for current day (as of 04 Apr 2017 08:56)  =============================================  IN: 720 ml / OUT: 1550 ml / NET: -830 ml          PHYSICAL EXAM:  General: Appears well developed, well nourished, no acute distress  HEENT: Head: normocephalic, atraumatic  Eyes: Pupils equal and reactive  Neck: Supple, no carotid bruit, no JVD, no HJR  CARDIOVASCULAR: Normal S1 and S2, no murmur, rub, or gallop  LUNGS: Clear to auscultation bilaterally, no rales, rhonchi or wheeze  ABDOMEN: Soft, nontender, non-distended, positive bowel sounds, no mass or bruit  EXTREMITIES: No edema, distal pulses WNL  SKIN: Warm and dry with normal turgor  NEURO: Alert & oriented x 3, grossly intact  PSYCH: normal mood and affect        LABS:                serum  Lipids:   Hemoglobin A1C, Whole Blood: 5.2 % (04-03 @ 05:12)        RADIOLOGY & ADDITIONAL STUDIES:      ECHO:    Summary:   1. Technically good study.   2. Normal global left ventricular systolic function.   3. Left ventricular ejection fraction, by visual estimation, is 60 to   65%.   4. Basal and mid inferior wall andbasal inferolateral segment are   abnormal as described above.   5. Elevated left atrial and left ventricular end-diastolic pressures.   6. Spectral Doppler shows restrictive pattern of left ventricular   myocardial filling (Grade III diastolic dysfunction).   7. There is mild septal left ventricular hypertrophy.   8. Normal right ventricular size and function.   9. Mild chordal systolic anterior motion of the mitral valve.  10. Mild to moderate mitral valve regurgitation.  11. Mild mitral annular calcification.  12. Mild aortic regurgitation.  13. Sclerotic aortic valve with normal opening.  14. Estimated pulmonary artery systolic pressure is 35.7 mmHg assuming a   right atrial pressure of 3 mmHg, which is consistent with borderline   pulmonary hypertension.  15. There is no evidence of pericardial effusion.    CT brain: IMPRESSION:    No acute intracranial hemorrhage, mass effect, or evidence of acute   territorial infarct.   If clinical symptoms persist, recommend follow-up imaging with   contrast-enhanced MRI if there are no contraindications.    Carotid: IMPRESSION:    1.  Right carotid artery: No evidence of hemodynamically significant   stenosis.   2.  Left carotid artery: Approximately 50-69% stenosis in the left   carotid bulb and proximal internal carotid artery.  3.  Vertebral arteries: Antegrade flow.  4.  Other: Elevated velocities in the left external carotid artery.    CARDIAC CATHETERIZATION:    ASSESSMENT AND PLAN:  In summary, NITISH FELTON is a 83y Male with past medical history significant for PMH of CAD/stent in remote past/HTN/PVD with carotid endarterectomy admitted with TIA, found to be bradycardic secondary to Toprol. No clinical CHF/cardiac ischemia. Last stress test in 2016 with no ischemia, echo from 01/2017 with normal LV function, mild to moderate valvular abnormalities   - Telemetry monitoring with sinus estefanía in the mid to upper 40's.  No sustained pauses or arrhythmia.   - Further work-up & testing (possible MRI, etc.) per neurology   - No evidence of ischemia or CHF clinically, eventual ischemic evaluation as an outpatient  - Rhythm/hemodynamics stable, avoid all AV verona blocking meds  - No clear cut indication for PPM at present time  - Neurologic symptoms have resolved. Pt noted to have 50-69% LICA. Broussard as indicated per neuro.  - Normal LV function     We will follow with you.  Thank you for allowing me to participate in the care of your patient      Alba Funez MD

## 2017-04-04 NOTE — PROGRESS NOTE ADULT - SUBJECTIVE AND OBJECTIVE BOX
INTERVAL HPI/OVERNIGHT EVENTS:    CC: acute stroke, carotid stenosis, bradycardia    No new complaints. Discussed findings of MRI with patient and family.    Vital Signs Last 24 Hrs  T(C): 36.1, Max: 36.9 (04-03 @ 20:20)  T(F): 97, Max: 98.4 (04-03 @ 20:20)  HR: 48 (48 - 50)  BP: 133/67 (133/67 - 158/70)  BP(mean): --  RR: 17 (16 - 20)  SpO2: 95% (95% - 97%)    PHYSICAL EXAM:    GENERAL: Not in distress, alert  CHEST/LUNG: b/l air entry, clear  HEART: Bradycardia  ABDOMEN: Soft, BS+  EXTREMITIES: No edema  Neuro: AOx3, no focal deficitis.    MEDICATIONS  (STANDING):  enoxaparin Injectable 40milliGRAM(s) SubCutaneous every 24 hours  simvastatin 40milliGRAM(s) Oral at bedtime  montelukast 10milliGRAM(s) Oral daily  docusate sodium 100milliGRAM(s) Oral two times a day  clopidogrel Tablet 75milliGRAM(s) Oral daily    MEDICATIONS  (PRN):  acetaminophen   Tablet 650milliGRAM(s) Oral every 6 hours PRN For Temp greater than 38 C (100.4 F)  acetaminophen   Tablet. 650milliGRAM(s) Oral every 6 hours PRN Moderate Pain (4 - 6)      Allergies    No Known Allergies    Intolerances          LABS:                  RADIOLOGY & ADDITIONAL TESTS:

## 2017-04-04 NOTE — DISCHARGE NOTE ADULT - MEDICATION SUMMARY - MEDICATIONS TO STOP TAKING
I will STOP taking the medications listed below when I get home from the hospital:    montelukast 10 mg oral tablet  -- 1 tab(s) by mouth once a day    Ecotrin 325 mg oral delayed release tablet  -- 1 tab(s) by mouth once a day    Toprol-XL 50 mg oral tablet, extended release  -- 1 tab(s) by mouth once a day I will STOP taking the medications listed below when I get home from the hospital:    montelukast 10 mg oral tablet  -- 1 tab(s) by mouth once a day    Toprol-XL 50 mg oral tablet, extended release  -- 1 tab(s) by mouth once a day

## 2017-04-05 VITALS
DIASTOLIC BLOOD PRESSURE: 74 MMHG | RESPIRATION RATE: 16 BRPM | OXYGEN SATURATION: 97 % | TEMPERATURE: 98 F | SYSTOLIC BLOOD PRESSURE: 143 MMHG | HEART RATE: 60 BPM

## 2017-04-05 LAB
ANION GAP SERPL CALC-SCNC: 10 MMOL/L — SIGNIFICANT CHANGE UP (ref 5–17)
BUN SERPL-MCNC: 15 MG/DL — SIGNIFICANT CHANGE UP (ref 8–20)
CALCIUM SERPL-MCNC: 8.4 MG/DL — LOW (ref 8.6–10.2)
CHLORIDE SERPL-SCNC: 106 MMOL/L — SIGNIFICANT CHANGE UP (ref 98–107)
CO2 SERPL-SCNC: 26 MMOL/L — SIGNIFICANT CHANGE UP (ref 22–29)
CREAT SERPL-MCNC: 0.77 MG/DL — SIGNIFICANT CHANGE UP (ref 0.5–1.3)
GLUCOSE SERPL-MCNC: 101 MG/DL — SIGNIFICANT CHANGE UP (ref 70–115)
POTASSIUM SERPL-MCNC: 4.3 MMOL/L — SIGNIFICANT CHANGE UP (ref 3.5–5.3)
POTASSIUM SERPL-SCNC: 4.3 MMOL/L — SIGNIFICANT CHANGE UP (ref 3.5–5.3)
SODIUM SERPL-SCNC: 142 MMOL/L — SIGNIFICANT CHANGE UP (ref 135–145)

## 2017-04-05 PROCEDURE — 70498 CT ANGIOGRAPHY NECK: CPT | Mod: 26

## 2017-04-05 PROCEDURE — 80053 COMPREHEN METABOLIC PANEL: CPT

## 2017-04-05 PROCEDURE — 99239 HOSP IP/OBS DSCHRG MGMT >30: CPT

## 2017-04-05 PROCEDURE — 85610 PROTHROMBIN TIME: CPT

## 2017-04-05 PROCEDURE — 93005 ELECTROCARDIOGRAM TRACING: CPT

## 2017-04-05 PROCEDURE — 84100 ASSAY OF PHOSPHORUS: CPT

## 2017-04-05 PROCEDURE — 80048 BASIC METABOLIC PNL TOTAL CA: CPT

## 2017-04-05 PROCEDURE — 85730 THROMBOPLASTIN TIME PARTIAL: CPT

## 2017-04-05 PROCEDURE — 83036 HEMOGLOBIN GLYCOSYLATED A1C: CPT

## 2017-04-05 PROCEDURE — 81001 URINALYSIS AUTO W/SCOPE: CPT

## 2017-04-05 PROCEDURE — 82962 GLUCOSE BLOOD TEST: CPT

## 2017-04-05 PROCEDURE — 70498 CT ANGIOGRAPHY NECK: CPT

## 2017-04-05 PROCEDURE — 70548 MR ANGIOGRAPHY NECK W/DYE: CPT

## 2017-04-05 PROCEDURE — 83735 ASSAY OF MAGNESIUM: CPT

## 2017-04-05 PROCEDURE — 36415 COLL VENOUS BLD VENIPUNCTURE: CPT

## 2017-04-05 PROCEDURE — 84484 ASSAY OF TROPONIN QUANT: CPT

## 2017-04-05 PROCEDURE — 99285 EMERGENCY DEPT VISIT HI MDM: CPT | Mod: 25

## 2017-04-05 PROCEDURE — 70551 MRI BRAIN STEM W/O DYE: CPT

## 2017-04-05 PROCEDURE — 93880 EXTRACRANIAL BILAT STUDY: CPT

## 2017-04-05 PROCEDURE — 70450 CT HEAD/BRAIN W/O DYE: CPT

## 2017-04-05 PROCEDURE — 70544 MR ANGIOGRAPHY HEAD W/O DYE: CPT

## 2017-04-05 PROCEDURE — 85027 COMPLETE CBC AUTOMATED: CPT

## 2017-04-05 PROCEDURE — 97163 PT EVAL HIGH COMPLEX 45 MIN: CPT

## 2017-04-05 PROCEDURE — 93306 TTE W/DOPPLER COMPLETE: CPT

## 2017-04-05 PROCEDURE — 80061 LIPID PANEL: CPT

## 2017-04-05 RX ORDER — SIMVASTATIN 20 MG/1
1 TABLET, FILM COATED ORAL
Qty: 30 | Refills: 0 | OUTPATIENT
Start: 2017-04-05 | End: 2017-05-05

## 2017-04-05 RX ORDER — MONTELUKAST 4 MG/1
1 TABLET, CHEWABLE ORAL
Qty: 0 | Refills: 0 | COMMUNITY

## 2017-04-05 RX ORDER — METOPROLOL TARTRATE 50 MG
1 TABLET ORAL
Qty: 0 | Refills: 0 | COMMUNITY

## 2017-04-05 RX ORDER — SIMVASTATIN 20 MG/1
1 TABLET, FILM COATED ORAL
Qty: 0 | Refills: 0 | COMMUNITY

## 2017-04-05 RX ORDER — AMLODIPINE BESYLATE 2.5 MG/1
1 TABLET ORAL
Qty: 30 | Refills: 0 | OUTPATIENT
Start: 2017-04-05 | End: 2017-05-05

## 2017-04-05 RX ORDER — CLOPIDOGREL BISULFATE 75 MG/1
1 TABLET, FILM COATED ORAL
Qty: 30 | Refills: 0 | OUTPATIENT
Start: 2017-04-05 | End: 2017-05-05

## 2017-04-05 RX ORDER — ATORVASTATIN CALCIUM 80 MG/1
40 TABLET, FILM COATED ORAL AT BEDTIME
Qty: 0 | Refills: 0 | Status: DISCONTINUED | OUTPATIENT
Start: 2017-04-05 | End: 2017-04-05

## 2017-04-05 RX ORDER — ASPIRIN/CALCIUM CARB/MAGNESIUM 324 MG
1 TABLET ORAL
Qty: 0 | Refills: 0 | COMMUNITY

## 2017-04-05 RX ADMIN — CLOPIDOGREL BISULFATE 75 MILLIGRAM(S): 75 TABLET, FILM COATED ORAL at 11:28

## 2017-04-05 RX ADMIN — AMLODIPINE BESYLATE 5 MILLIGRAM(S): 2.5 TABLET ORAL at 06:09

## 2017-04-05 NOTE — PHYSICAL THERAPY INITIAL EVALUATION ADULT - PERTINENT HX OF CURRENT PROBLEM, REHAB EVAL
pt presents to [facility] due to right arm weakness, (+) CVA

## 2017-04-05 NOTE — PROGRESS NOTE ADULT - PROBLEM SELECTOR PLAN 1
Continue statin. Start Plavix per neuro, awaiting PT eval. Continue statin, discussed with neurology, advised low dose aspirin and Plavix. Explained to family.

## 2017-04-05 NOTE — PROGRESS NOTE ADULT - SUBJECTIVE AND OBJECTIVE BOX
Patient is a 83y old  Male who presents with a chief complaint of right arm weakness. (04 Apr 2017 17:21)  S/P L CVA  Currently without symptoms or complaints    PAST MEDICAL & SURGICAL HISTORY:  Stented coronary artery: x3  Hypertension  CAD (coronary artery disease)  Asthma  S/P hernia surgery  H/O carotid endarterectomy: BILATERAL    MEDICATIONS  (STANDING):  enoxaparin Injectable 40milliGRAM(s) SubCutaneous every 24 hours  montelukast 10milliGRAM(s) Oral daily  docusate sodium 100milliGRAM(s) Oral two times a day  clopidogrel Tablet 75milliGRAM(s) Oral daily  amLODIPine   Tablet 5milliGRAM(s) Oral daily  atorvastatin 40milliGRAM(s) Oral at bedtime    MEDICATIONS  (PRN):  acetaminophen   Tablet 650milliGRAM(s) Oral every 6 hours PRN For Temp greater than 38 C (100.4 F)  acetaminophen   Tablet. 650milliGRAM(s) Oral every 6 hours PRN Moderate Pain (4 - 6)      Allergies:No Known Allergies    Vital Signs Last 24 Hrs  T(C): 36.4, Max: 36.6 (04-04 @ 21:53)  T(F): 97.6, Max: 97.8 (04-04 @ 21:53)  HR: 60 (48 - 60)  BP: 143/74 (138/68 - 168/70)  BP(mean): --  RR: 16 (16 - 18)  SpO2: 97% (95% - 97%)  I&O's Detail    I & Os for current day (as of 05 Apr 2017 11:44)  =============================================  IN:    Oral Fluid: 240 ml    Total IN: 240 ml  ---------------------------------------------  OUT:    Voided: 2725 ml    Total OUT: 2725 ml  ---------------------------------------------  Total NET: -2485 ml      Physical Exam:  General: NAD, OOB ambulated with PT  Pulmonary: Nonlabored breathing, no respiratory distress  Cardiovascular: Normal S1, S2  Abdominal: soft, NT/ND  Extremities: WWP    LABS:    04-05    142  |  106  |  15.0  ----------------------------<  101  4.3   |  26.0  |  0.77    Ca    8.4<L>      05 Apr 2017 05:55        CAPILLARY BLOOD GLUCOSE    Radiology and Additional Studies:  EXAM:  CT ANGIO NECK (W)AW IC                          PROCEDURE DATE:  04/05/2017        INTERPRETATION:  CLINICAL HISTORY: Left-sided CVA, bilateral carotid   neurectomy stenosis    TECHNIQUE: CTA brain and neck. 95 cc Omnipaque 350 Intravenous contrast   was administered. 2-D MIP and 3-D volume rendering images.    COMPARISON: MRA neck dated 4/3/2017    FINDINGS:    CTA NECK:  Patient is status post bilateral carotid endarterectomy. Common origin to   the innominate artery left common carotid artery.    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 The common carotid arteries,   internal carotid arteries and vertebral arteries shows no evidence of   significant stenosis, occlusion or saccular aneurysm dilation. The   vertebral arteries are codominant.      IMPRESSION:      No significant stenosis. Status post bilateral carotid endarterectomy.  Assessment and Plan: 83y Male Transient cerebral ischemia  No evidence for hemodynamically sig carotid stenosis  No vascular surgery intervention necessary  Cont antiplatelet and statin  Discussed with Dr Lemus and Dr Luna

## 2017-04-05 NOTE — PROGRESS NOTE ADULT - SUBJECTIVE AND OBJECTIVE BOX
[facility], [facility]                                          [address], [address], [place] [address]                                               PHONE: [phone]    FAX: [phone] [address], [address], [place] [address]                                       PHONE: [phone]    FAX: [phone]  *******************************************************************************    Overnight events/Subjective Assessment: No events, neurologically improved/improving.  No cardiac complaints    INTERPRETATION OF TELEMETRY (personally reviewed): SR 40s- 60s    No Known Allergies    MEDICATIONS  (STANDING):  enoxaparin Injectable 40milliGRAM(s) SubCutaneous every 24 hours  simvastatin 40milliGRAM(s) Oral at bedtime  montelukast 10milliGRAM(s) Oral daily  docusate sodium 100milliGRAM(s) Oral two times a day  clopidogrel Tablet 75milliGRAM(s) Oral daily  amLODIPine   Tablet 5milliGRAM(s) Oral daily    MEDICATIONS  (PRN):  acetaminophen   Tablet 650milliGRAM(s) Oral every 6 hours PRN For Temp greater than 38 C (100.4 F)  acetaminophen   Tablet. 650milliGRAM(s) Oral every 6 hours PRN Moderate Pain (4 - 6)      Vital Signs Last 24 Hrs  T(C): 36.4, Max: 36.6 (04-04 @ 21:53)  T(F): 97.5, Max: 97.8 (04-04 @ 21:53)  HR: 48 (48 - 56)  BP: 168/70 (133/67 - 168/70)  BP(mean): --  RR: 18 (17 - 18)  SpO2: 95% (95% - 95%)    I&O's Detail    I & Os for current day (as of 05 Apr 2017 07:38)  =============================================  IN:    Oral Fluid: 240 ml    Total IN: 240 ml  ---------------------------------------------  OUT:    Voided: 2725 ml    Total OUT: 2725 ml  ---------------------------------------------  Total NET: -2485 ml    I&O's Summary    I & Os for current day (as of 05 Apr 2017 07:38)  =============================================  IN: 240 ml / OUT: 2725 ml / NET: -2485 ml          PHYSICAL EXAM:  General: Appears well developed, well nourished, no acute distress  HEENT: Head: normocephalic, atraumatic  Eyes: Pupils equal and reactive  Neck: Supple, no carotid bruit, no JVD, no HJR  CARDIOVASCULAR: Wilber, Normal S1 and S2, no murmur, rub, or gallop  LUNGS: exp wheeze bilats  ABDOMEN: Soft, nontender, non-distended, positive bowel sounds, no mass or bruit  EXTREMITIES: No edema, distal pulses WNL  SKIN: Warm and dry with normal turgor  NEURO: Alert & oriented x 3, grossly intact  PSYCH: normal mood and affect        LABS:    05 Apr 2017 05:55    142    |  106    |  15.0   ----------------------------<  101    4.3     |  26.0   |  0.77     Ca    8.4        05 Apr 2017 05:55            serum  Lipids:   Hemoglobin A1C, Whole Blood: 5.2 % (04-03 @ 05:12)        RADIOLOGY & ADDITIONAL STUDIES:  MRI 4/3/17   IMPRESSION: Acute small posterior left frontal lobe cortical infarcts.   Preliminary report provided by [facility] Radiologist: Roger      MRA 4/3/17FINDINGS:  Left 50% stenosis in the left carotid bulb. Mild to moderate stenosis at   the origin of the left external carotid artery. Mild stenosis in the mid   to distal right common carotid artery.    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 There is normal relationship to   the great vessels. The common carotid arteries, internal carotid arteries   and vertebral arteries shows no other evidence of significant stenosis,   occlusion or saccular aneurysm dilation. The vertebral arteries are   codominant.      IMPRESSION:  Less than 50% stenosis in the left carotid bulb..   Preliminary report provided by [facility] Radiologist: Roger.       ECHO:    Summary:   1. Technically good study.   2. Normal global left ventricular systolic function.   3. Left ventricular ejection fraction, by visual estimation, is 60 to   65%.   4. Basal and mid inferior wall andbasal inferolateral segment are   abnormal as described above.   5. Elevated left atrial and left ventricular end-diastolic pressures.   6. Spectral Doppler shows restrictive pattern of left ventricular   myocardial filling (Grade III diastolic dysfunction).   7. There is mild septal left ventricular hypertrophy.   8. Normal right ventricular size and function.   9. Mild chordal systolic anterior motion of the mitral valve.  10. Mild to moderate mitral valve regurgitation.  11. Mild mitral annular calcification.  12. Mild aortic regurgitation.  13. Sclerotic aortic valve with normal opening.  14. Estimated pulmonary artery systolic pressure is 35.7 mmHg assuming a   right atrial pressure of 3 mmHg, which is consistent with borderline   pulmonary hypertension.  15. There is no evidence of pericardial effusion.    CT brain: IMPRESSION:    No acute intracranial hemorrhage, mass effect, or evidence of acute   territorial infarct.   If clinical symptoms persist, recommend follow-up imaging with   contrast-enhanced MRI if there are no contraindications.    Carotid: IMPRESSION:    1.  Right carotid artery: No evidence of hemodynamically significant   stenosis.   2.  Left carotid artery: Approximately 50-69% stenosis in the left   carotid bulb and proximal internal carotid artery.  3.  Vertebral arteries: Antegrade flow.  4.  Other: Elevated velocities in the left external carotid artery.      ASSESSMENT AND PLAN:    83y Male with past medical history significant for PMH of CAD/stent in remote past/HTN/PVD with carotid endarterectomy admitted with TIA, found to be bradycardic secondary to Toprol. No clinical CHF/cardiac ischemia. Last stress test in 2016 with no ischemia, echo from 01/2017 with normal LV function, mild to moderate valvular abnormalities     - Telemetry monitoring with sinus wilber in the mid to upper 40's, ranging to 60s   - No evidence of ischemia or CHF clinically, eventual ischemic evaluation as an outpatient  - Rhythm/hemodynamics stable, avoid all AV verona blocking meds  - No indication for PPM at present time  - Wall motion abnormality on echo with preserved overall LV systolic function.  Outpatient ischemia eval.  No plan for cardiac cath for at least 1 month post neurologic event.  - Neurologic symptoms have resolved. Pt noted to have 50-69% LICA on carotid doppler.  MRA suggests < 50% LICA stenosis. Neuro follow up.   - There does not appear to be an indication for carotid stent or repeat surgical intervention at this time, but will defer to neurology   - Chol 135, , HDL 37, LDL 76.  Will change to Lipitor 40 ( with LDL goal < 70mg/dl)  - Continue Plavix  - DC planning if no further work up/intervention planned    Miguel Ochoa MD

## 2017-04-05 NOTE — PROGRESS NOTE ADULT - ASSESSMENT
The patient is a 83y Male with small left frontal CVA and no persistent deficits  OK to change antiplatelets to ASA 81/plabvix 75 in light of his vasculopathy.  to follow in office in 2-3 weeks    Gigi Franz MD, PhD   [id number]

## 2017-04-05 NOTE — PROGRESS NOTE ADULT - SUBJECTIVE AND OBJECTIVE BOX
[facility]                                 [address]                                  [address]                                        [place], [place], [address]                                             [phone]    Saw patient earlier today.      Vital signs:  T(C): 36.4, Max: 36.6 (04-04 @ 21:53)  HR: 60 (48 - 60)  BP: 143/74 (138/68 - 168/70)  RR: 16 (16 - 18)  SpO2: 97% (95% - 97%)  Wt(kg): --    Exam:    No new complaints.  Awake and alert.  speech language intact  Pupils react.  Face symmetric smile and sensation  hearing symmetric  tongue ML  5/5 power in 4 ext  no drift  intact FT x 4 ext

## 2017-04-05 NOTE — PROGRESS NOTE ADULT - SUBJECTIVE AND OBJECTIVE BOX
INTERVAL HPI/OVERNIGHT EVENTS:    CC: acute stroke, hypertension, bradycardia    No overnight events, no new complaints. Had CTA this am.      Vital Signs Last 24 Hrs  T(C): 36.4, Max: 36.6 (04-04 @ 21:53)  T(F): 97.5, Max: 97.8 (04-04 @ 21:53)  HR: 48 (48 - 56)  BP: 168/70 (133/67 - 168/70)  BP(mean): --  RR: 18 (17 - 18)  SpO2: 95% (95% - 95%)    PHYSICAL EXAM:    GENERAL: Not in distress, alert  CHEST/LUNG: b/l air entry  HEART: Regular   ABDOMEN: Soft, BS+  EXTREMITIES:  No edema    MEDICATIONS  (STANDING):  enoxaparin Injectable 40milliGRAM(s) SubCutaneous every 24 hours  montelukast 10milliGRAM(s) Oral daily  docusate sodium 100milliGRAM(s) Oral two times a day  clopidogrel Tablet 75milliGRAM(s) Oral daily  amLODIPine   Tablet 5milliGRAM(s) Oral daily  atorvastatin 40milliGRAM(s) Oral at bedtime    MEDICATIONS  (PRN):  acetaminophen   Tablet 650milliGRAM(s) Oral every 6 hours PRN For Temp greater than 38 C (100.4 F)  acetaminophen   Tablet. 650milliGRAM(s) Oral every 6 hours PRN Moderate Pain (4 - 6)      Allergies    No Known Allergies    Intolerances          LABS:      04-05    142  |  106  |  15.0  ----------------------------<  101  4.3   |  26.0  |  0.77    Ca    8.4<L>      05 Apr 2017 05:55            RADIOLOGY & ADDITIONAL TESTS:

## 2017-04-05 NOTE — PROGRESS NOTE ADULT - PROBLEM SELECTOR PLAN 4
No Toprol, outpatient cardiology follow up.
Off Toprol, monitor HR. No PPM per cardiology.
Stable, on Montelukast.
Stable, on Montelukast.

## 2017-04-05 NOTE — PROGRESS NOTE ADULT - ATTENDING COMMENTS
Plan of care discussed with patient and son at bedside.
Plan of care discussed with patient and wife at bedside.
Plan of care discussed with patient and family. Stable for discharge home.   Spent > 35 mins in discharge plan and documentation.
Plan of care discussed at length with patient and family at bedside.

## 2017-04-05 NOTE — PROGRESS NOTE ADULT - ASSESSMENT
83 yr old male with CAD s/p stent, hypertension, carotid endarterectomy admitted with right arm weakness and word finding difficulty, CT brain no acute stroke. He was evaluated by tele neurology on presentation and no tPA was given. Carotid ultrasound revealed stenosis on left. MRI brain and MRA neck was ordered. Toprol held given bradycardia and cardiology consulted. No indication of PPM per cardiology. MRI brain consistent with acute stroke and MRA neck showed <50 % left carotid stenosis, hence vascular surgery consulted. CTA done, no significant stenosis.

## 2017-04-05 NOTE — PROGRESS NOTE ADULT - PROBLEM SELECTOR PLAN 5
On Singulair, outpatient pulmonary follow up. Patient wants to discontinue the same. Stable at this time.
On Singulair, outpatient pulmonary follow up.

## 2017-04-28 PROBLEM — Z00.00 ENCOUNTER FOR PREVENTIVE HEALTH EXAMINATION: Status: ACTIVE | Noted: 2017-04-28

## 2017-05-01 PROBLEM — I25.10 ATHEROSCLEROTIC HEART DISEASE OF NATIVE CORONARY ARTERY WITHOUT ANGINA PECTORIS: Chronic | Status: ACTIVE | Noted: 2017-04-01

## 2017-05-01 PROBLEM — J45.909 UNSPECIFIED ASTHMA, UNCOMPLICATED: Chronic | Status: ACTIVE | Noted: 2017-04-01

## 2017-05-01 PROBLEM — Z95.5 PRESENCE OF CORONARY ANGIOPLASTY IMPLANT AND GRAFT: Chronic | Status: ACTIVE | Noted: 2017-04-01

## 2017-05-01 PROBLEM — I10 ESSENTIAL (PRIMARY) HYPERTENSION: Chronic | Status: ACTIVE | Noted: 2017-04-01

## 2017-05-19 DIAGNOSIS — Z86.73 PERSONAL HISTORY OF TRANSIENT ISCHEMIC ATTACK (TIA), AND CEREBRAL INFARCTION W/OUT RESIDUAL DEFICITS: ICD-10-CM

## 2017-05-19 DIAGNOSIS — I25.10 ATHEROSCLEROTIC HEART DISEASE OF NATIVE CORONARY ARTERY W/OUT ANGINA PECTORIS: ICD-10-CM

## 2017-05-19 DIAGNOSIS — Z87.891 PERSONAL HISTORY OF NICOTINE DEPENDENCE: ICD-10-CM

## 2017-05-19 DIAGNOSIS — Z86.79 PERSONAL HISTORY OF OTHER DISEASES OF THE CIRCULATORY SYSTEM: ICD-10-CM

## 2017-05-22 ENCOUNTER — APPOINTMENT (OUTPATIENT)
Dept: PULMONOLOGY | Facility: CLINIC | Age: 82
End: 2017-05-22

## 2017-05-22 VITALS
DIASTOLIC BLOOD PRESSURE: 70 MMHG | OXYGEN SATURATION: 97 % | RESPIRATION RATE: 16 BRPM | WEIGHT: 176 LBS | HEART RATE: 48 BPM | SYSTOLIC BLOOD PRESSURE: 130 MMHG | HEIGHT: 68 IN | BODY MASS INDEX: 26.67 KG/M2

## 2017-05-22 RX ORDER — ASPIRIN 81 MG
81 TABLET, DELAYED RELEASE (ENTERIC COATED) ORAL
Refills: 0 | Status: ACTIVE | COMMUNITY

## 2017-05-22 RX ORDER — FINASTERIDE 5 MG/1
5 TABLET, FILM COATED ORAL
Refills: 0 | Status: ACTIVE | COMMUNITY

## 2017-05-22 RX ORDER — SIMVASTATIN 40 MG/1
40 TABLET, FILM COATED ORAL
Refills: 0 | Status: ACTIVE | COMMUNITY

## 2017-05-22 RX ORDER — BUDESONIDE 0.5 MG/2ML
0.5 INHALANT ORAL
Refills: 0 | Status: ACTIVE | COMMUNITY

## 2017-05-22 RX ORDER — ALBUTEROL SULFATE 2.5 MG/3ML
(2.5 MG/3ML) SOLUTION RESPIRATORY (INHALATION)
Refills: 0 | Status: ACTIVE | COMMUNITY

## 2017-05-22 RX ORDER — APIXABAN 5 MG/1
5 TABLET, FILM COATED ORAL
Refills: 0 | Status: ACTIVE | COMMUNITY

## 2017-05-22 RX ORDER — AMLODIPINE BESYLATE 5 MG/1
5 TABLET ORAL
Refills: 0 | Status: ACTIVE | COMMUNITY

## 2017-07-26 ENCOUNTER — APPOINTMENT (OUTPATIENT)
Dept: PULMONOLOGY | Facility: CLINIC | Age: 82
End: 2017-07-26

## 2017-07-26 VITALS
OXYGEN SATURATION: 98 % | BODY MASS INDEX: 26.91 KG/M2 | HEIGHT: 68 IN | DIASTOLIC BLOOD PRESSURE: 60 MMHG | SYSTOLIC BLOOD PRESSURE: 130 MMHG | HEART RATE: 51 BPM

## 2017-07-26 VITALS — WEIGHT: 177 LBS | BODY MASS INDEX: 26.91 KG/M2

## 2017-07-26 RX ORDER — IPRATROPIUM BROMIDE AND ALBUTEROL SULFATE 2.5; .5 MG/3ML; MG/3ML
0.5-2.5 (3) SOLUTION RESPIRATORY (INHALATION)
Qty: 180 | Refills: 0 | Status: DISCONTINUED | COMMUNITY
Start: 2016-09-15 | End: 2017-07-26

## 2017-07-26 RX ORDER — CLOPIDOGREL BISULFATE 75 MG/1
75 TABLET, FILM COATED ORAL
Qty: 30 | Refills: 0 | Status: DISCONTINUED | COMMUNITY
Start: 2017-04-05 | End: 2017-07-26

## 2017-07-26 RX ORDER — AMOXICILLIN 500 MG/1
500 TABLET, FILM COATED ORAL
Qty: 22 | Refills: 0 | Status: DISCONTINUED | COMMUNITY
Start: 2017-04-12

## 2017-07-26 RX ORDER — MONTELUKAST 10 MG/1
10 TABLET, FILM COATED ORAL
Qty: 30 | Refills: 0 | Status: DISCONTINUED | COMMUNITY
Start: 2017-03-16 | End: 2017-07-26

## 2017-07-26 RX ORDER — METOPROLOL TARTRATE 25 MG/1
25 TABLET, FILM COATED ORAL
Qty: 90 | Refills: 0 | Status: DISCONTINUED | COMMUNITY
Start: 2017-01-26 | End: 2017-07-26

## 2017-07-26 RX ORDER — CLINDAMYCIN HYDROCHLORIDE 150 MG/1
150 CAPSULE ORAL
Qty: 20 | Refills: 0 | Status: DISCONTINUED | COMMUNITY
Start: 2017-04-29

## 2017-10-16 ENCOUNTER — APPOINTMENT (OUTPATIENT)
Dept: PULMONOLOGY | Facility: CLINIC | Age: 82
End: 2017-10-16
Payer: MEDICARE

## 2017-10-16 VITALS
HEART RATE: 50 BPM | DIASTOLIC BLOOD PRESSURE: 62 MMHG | OXYGEN SATURATION: 95 % | SYSTOLIC BLOOD PRESSURE: 128 MMHG | HEIGHT: 68 IN | BODY MASS INDEX: 26.22 KG/M2 | WEIGHT: 173 LBS

## 2017-10-16 PROCEDURE — 99214 OFFICE O/P EST MOD 30 MIN: CPT

## 2018-04-20 ENCOUNTER — RX RENEWAL (OUTPATIENT)
Age: 83
End: 2018-04-20

## 2018-04-20 ENCOUNTER — APPOINTMENT (OUTPATIENT)
Dept: PULMONOLOGY | Facility: CLINIC | Age: 83
End: 2018-04-20
Payer: MEDICARE

## 2018-04-20 VITALS — WEIGHT: 180 LBS | BODY MASS INDEX: 27.37 KG/M2

## 2018-04-20 VITALS — HEART RATE: 51 BPM | OXYGEN SATURATION: 95 % | DIASTOLIC BLOOD PRESSURE: 80 MMHG | SYSTOLIC BLOOD PRESSURE: 140 MMHG

## 2018-04-20 PROCEDURE — 94664 DEMO&/EVAL PT USE INHALER: CPT | Mod: 59

## 2018-04-20 PROCEDURE — 94060 EVALUATION OF WHEEZING: CPT

## 2018-04-20 PROCEDURE — 99214 OFFICE O/P EST MOD 30 MIN: CPT | Mod: 25

## 2018-04-20 RX ORDER — ALBUTEROL SULFATE 2.5 MG/3ML
(2.5 MG/3ML) SOLUTION RESPIRATORY (INHALATION)
Qty: 1 | Refills: 5 | Status: ACTIVE | COMMUNITY
Start: 2018-04-20 | End: 1900-01-01

## 2018-04-20 RX ORDER — BECLOMETHASONE DIPROPIONATE 80 UG/1
80 AEROSOL, METERED RESPIRATORY (INHALATION) TWICE DAILY
Qty: 3 | Refills: 1 | Status: DISCONTINUED | COMMUNITY
Start: 2018-04-20 | End: 2018-04-20

## 2018-06-20 ENCOUNTER — APPOINTMENT (OUTPATIENT)
Dept: PULMONOLOGY | Facility: CLINIC | Age: 83
End: 2018-06-20
Payer: MEDICARE

## 2018-06-20 VITALS — DIASTOLIC BLOOD PRESSURE: 60 MMHG | HEART RATE: 101 BPM | OXYGEN SATURATION: 96 % | SYSTOLIC BLOOD PRESSURE: 110 MMHG

## 2018-06-20 VITALS — BODY MASS INDEX: 27.37 KG/M2 | WEIGHT: 180 LBS

## 2018-06-20 PROCEDURE — 94010 BREATHING CAPACITY TEST: CPT

## 2018-06-20 PROCEDURE — 99214 OFFICE O/P EST MOD 30 MIN: CPT | Mod: 25

## 2018-07-28 PROBLEM — Z86.73 HISTORY OF STROKE: Status: RESOLVED | Noted: 2017-05-19 | Resolved: 2018-07-28

## 2018-10-14 ENCOUNTER — RX RENEWAL (OUTPATIENT)
Age: 83
End: 2018-10-14

## 2018-10-25 ENCOUNTER — OTHER (OUTPATIENT)
Age: 83
End: 2018-10-25

## 2018-12-11 ENCOUNTER — APPOINTMENT (OUTPATIENT)
Dept: PULMONOLOGY | Facility: CLINIC | Age: 83
End: 2018-12-11
Payer: MEDICARE

## 2018-12-11 VITALS
HEART RATE: 106 BPM | SYSTOLIC BLOOD PRESSURE: 110 MMHG | OXYGEN SATURATION: 96 % | WEIGHT: 184 LBS | DIASTOLIC BLOOD PRESSURE: 70 MMHG | BODY MASS INDEX: 27.98 KG/M2

## 2018-12-11 DIAGNOSIS — J30.9 ALLERGIC RHINITIS, UNSPECIFIED: ICD-10-CM

## 2018-12-11 DIAGNOSIS — J45.40 MODERATE PERSISTENT ASTHMA, UNCOMPLICATED: ICD-10-CM

## 2018-12-11 DIAGNOSIS — K21.9 GASTRO-ESOPHAGEAL REFLUX DISEASE W/OUT ESOPHAGITIS: ICD-10-CM

## 2018-12-11 DIAGNOSIS — R06.09 OTHER FORMS OF DYSPNEA: ICD-10-CM

## 2018-12-11 PROCEDURE — 99214 OFFICE O/P EST MOD 30 MIN: CPT | Mod: 25

## 2018-12-11 RX ORDER — CROMOLYN SODIUM INHALATION SOLUTION 20 MG/2ML
20 SOLUTION RESPIRATORY (INHALATION) 3 TIMES DAILY
Qty: 60 | Refills: 3 | Status: DISCONTINUED | COMMUNITY
Start: 2017-05-22 | End: 2018-12-11

## 2018-12-11 RX ORDER — MOMETASONE FUROATE 220 UG/1
220 INHALANT RESPIRATORY (INHALATION) DAILY
Qty: 3 | Refills: 3 | Status: DISCONTINUED | COMMUNITY
Start: 2018-04-20 | End: 2018-12-11

## 2018-12-11 RX ORDER — BECLOMETHASONE DIPROPIONATE HFA 80 UG/1
80 AEROSOL, METERED RESPIRATORY (INHALATION) TWICE DAILY
Qty: 1 | Refills: 5 | Status: ACTIVE | COMMUNITY
Start: 2018-12-11 | End: 1900-01-01

## 2018-12-11 RX ORDER — BECLOMETHASONE DIPROPIONATE 40 UG/1
40 AEROSOL, METERED RESPIRATORY (INHALATION) TWICE DAILY
Qty: 1 | Refills: 5 | Status: DISCONTINUED | COMMUNITY
Start: 2017-07-26 | End: 2018-12-11

## 2018-12-11 RX ORDER — DEXAMETHASONE SODIUM PHOSPHATE 4 MG/ML
4 INJECTION, SOLUTION INTRAMUSCULAR; INTRAVENOUS
Qty: 0 | Refills: 0 | Status: COMPLETED | OUTPATIENT
Start: 2018-12-11

## 2018-12-11 RX ADMIN — DEXAMETHASONE SODIUM PHOSPHATE 1.5 MG/ML: 4 INJECTION, SOLUTION INTRA-ARTICULAR; INTRALESIONAL; INTRAMUSCULAR; INTRAVENOUS; SOFT TISSUE at 00:00

## 2019-02-19 ENCOUNTER — APPOINTMENT (OUTPATIENT)
Dept: PULMONOLOGY | Facility: CLINIC | Age: 84
End: 2019-02-19

## 2019-04-08 ENCOUNTER — RX RENEWAL (OUTPATIENT)
Age: 84
End: 2019-04-08

## 2019-07-01 ENCOUNTER — RX RENEWAL (OUTPATIENT)
Age: 84
End: 2019-07-01

## 2019-07-01 RX ORDER — MONTELUKAST 10 MG/1
10 TABLET, FILM COATED ORAL
Qty: 90 | Refills: 1 | Status: ACTIVE | COMMUNITY
Start: 2018-04-20 | End: 1900-01-01

## 2020-10-01 ENCOUNTER — EMERGENCY (EMERGENCY)
Facility: HOSPITAL | Age: 85
LOS: 1 days | Discharge: HOME | End: 2020-10-01
Attending: EMERGENCY MEDICINE
Payer: MEDICARE

## 2020-10-01 VITALS
WEIGHT: 179.9 LBS | OXYGEN SATURATION: 98 % | TEMPERATURE: 98 F | SYSTOLIC BLOOD PRESSURE: 144 MMHG | HEIGHT: 70 IN | DIASTOLIC BLOOD PRESSURE: 66 MMHG | RESPIRATION RATE: 18 BRPM | HEART RATE: 60 BPM

## 2020-10-01 DIAGNOSIS — Z98.890 OTHER SPECIFIED POSTPROCEDURAL STATES: Chronic | ICD-10-CM

## 2020-10-01 PROCEDURE — 99283 EMERGENCY DEPT VISIT LOW MDM: CPT

## 2020-10-01 PROCEDURE — 73590 X-RAY EXAM OF LOWER LEG: CPT | Mod: 26,RT

## 2020-10-01 PROCEDURE — 73590 X-RAY EXAM OF LOWER LEG: CPT

## 2020-10-01 NOTE — ED STATDOCS - ATTENDING CONTRIBUTION TO CARE
I,Josue Terry MD,  performed the initial face to face bedside interview with this patient regarding history of present illness, review of symptoms and relevant past medical, social and family history.  I completed an independent physical examination.  I was the initial provider who evaluated this patient. I have signed out the follow up of any pending tests (i.e. labs, radiological studies) to the ACP.  I have communicated the patient’s plan of care and disposition with the ACP.  The history, relevant review of systems, past medical and surgical history, medical decision making, and physical examination was documented by the scribe in my presence and I attest to the accuracy of the documentation.

## 2020-10-01 NOTE — ED STATDOCS - PROGRESS NOTE DETAILS
Pt is seen by Dr Terry initially agreed with hx , PE and plan   seen the at the bed side , related at the bed side presents   spoke with radiologist @[id number]. xray W.o any acute facture or dislocation   seen the pt at the bed side ,small hematoma on the lateral distal shin area. not warm to touch ,no strike redness, Pt ambulatory Normal steady gait . pt is request the crutches . he is able to walk with crutches . will d.c  F.u pcp and ortho

## 2020-10-01 NOTE — ED STATDOCS - PATIENT PORTAL LINK FT
You can access the FollowMyHealth Patient Portal offered by [facility] by registering at the following website: [email or web address]. By joining [employer]’s FollowMyHealth portal, you will also be able to view your health information using other applications (apps) compatible with our system.

## 2020-10-01 NOTE — ED STATDOCS - CARE PROVIDER_API CALL
Miguel Roque  ORTHOPAEDIC SURGERY  [address]  [place] [address]  Phone: [phone]  Fax: [phone]  Follow Up Time:

## 2020-10-01 NOTE — ED STATDOCS - OBJECTIVE STATEMENT
85 y/o M pt with significant PMHx of asthma, CAD, HTN, and coronary artery stents, presents to the ED c/o worsening lower leg pain s/p a ladder falling on his leg a week ago. He was using the ladder to get to his roof. Associated bruising and warmth. On Eliquis for AFib. Has a pacemaker. No further complaints at this time.

## 2021-10-28 ENCOUNTER — TRANSCRIPTION ENCOUNTER (OUTPATIENT)
Age: 86
End: 2021-10-28

## 2022-06-23 NOTE — ED ADULT NURSE NOTE - NS ED NURSE TRANSPORT WITH
SW will provide support, insight, discharge planning, psycho-education, maintain contact with identified supports and encourage treatment compliance. cardiac monitor

## 2022-11-03 NOTE — ED ADULT TRIAGE NOTE - CHIEF COMPLAINT QUOTE
pt presents to ED with rigth UE weakness and slurred speech started two hours ago, now resolving. + b/l equal motor strength noted to upper extremites no loss of sensation noted. denies headache .no facial droop noted.  dr alcantara called to bedside for immediate valuation. pt brought to intake 4.
done

## 2022-12-08 ENCOUNTER — OFFICE (OUTPATIENT)
Dept: URBAN - METROPOLITAN AREA CLINIC 115 | Facility: CLINIC | Age: 87
Setting detail: OPHTHALMOLOGY
End: 2022-12-08
Payer: MEDICARE

## 2022-12-08 DIAGNOSIS — H35.373: ICD-10-CM

## 2022-12-08 DIAGNOSIS — H35.3221: ICD-10-CM

## 2022-12-08 DIAGNOSIS — H35.3112: ICD-10-CM

## 2022-12-08 DIAGNOSIS — H43.823: ICD-10-CM

## 2022-12-08 DIAGNOSIS — H35.033: ICD-10-CM

## 2022-12-08 PROCEDURE — 67028 INJECTION EYE DRUG: CPT | Performed by: OPHTHALMOLOGY

## 2022-12-08 PROCEDURE — 92134 CPTRZ OPH DX IMG PST SGM RTA: CPT | Performed by: OPHTHALMOLOGY

## 2022-12-08 ASSESSMENT — TONOMETRY
OS_IOP_MMHG: 11
OD_IOP_MMHG: 10

## 2022-12-08 ASSESSMENT — REFRACTION_AUTOREFRACTION
OS_SPHERE: 0.00
OD_SPHERE: +0.75
OD_AXIS: 083
OS_CYLINDER: -0.25
OS_AXIS: 170
OD_CYLINDER: -0.75

## 2022-12-08 ASSESSMENT — LID EXAM ASSESSMENTS
OD_BLEPHARITIS: RUL T
OS_BLEPHARITIS: LUL T

## 2022-12-08 ASSESSMENT — SPHEQUIV_DERIVED
OD_SPHEQUIV: 0.375
OS_SPHEQUIV: -0.125

## 2022-12-08 ASSESSMENT — REFRACTION_CURRENTRX
OD_AXIS: 180
OD_CYLINDER: 0.00
OD_OVR_VA: 20/
OD_SPHERE: +2.50
OS_SPHERE: +2.25
OS_CYLINDER: 0.00
OD_SPHERE: +2.25
OD_CYLINDER: -0.25
OS_SPHERE: +0.75
OS_AXIS: 180
OS_OVR_VA: 20/
OS_CYLINDER: 0.00
OD_CYLINDER: 0.00
OS_SPHERE: +2.25
OD_OVR_VA: 20/
OD_AXIS: 180
OS_AXIS: 180
OS_OVR_VA: 20/
OS_CYLINDER: 0.00
OS_AXIS: 180
OS_OVR_VA: 20/
OD_SPHERE: +1.75
OD_OVR_VA: 20/
OD_AXIS: 113

## 2022-12-08 ASSESSMENT — VISUAL ACUITY
OS_BCVA: 20/25
OD_BCVA: 20/80-1

## 2022-12-08 ASSESSMENT — CONFRONTATIONAL VISUAL FIELD TEST (CVF)
OD_FINDINGS: FULL
OS_FINDINGS: FULL

## 2023-02-28 ENCOUNTER — OFFICE (OUTPATIENT)
Dept: URBAN - METROPOLITAN AREA CLINIC 115 | Facility: CLINIC | Age: 88
Setting detail: OPHTHALMOLOGY
End: 2023-02-28
Payer: MEDICARE

## 2023-02-28 DIAGNOSIS — H43.823: ICD-10-CM

## 2023-02-28 DIAGNOSIS — H35.3221: ICD-10-CM

## 2023-02-28 DIAGNOSIS — H35.373: ICD-10-CM

## 2023-02-28 DIAGNOSIS — H35.3112: ICD-10-CM

## 2023-02-28 DIAGNOSIS — H35.033: ICD-10-CM

## 2023-02-28 PROCEDURE — 92134 CPTRZ OPH DX IMG PST SGM RTA: CPT | Performed by: SPECIALIST

## 2023-02-28 PROCEDURE — 67028 INJECTION EYE DRUG: CPT | Performed by: SPECIALIST

## 2023-02-28 PROCEDURE — 92012 INTRM OPH EXAM EST PATIENT: CPT | Performed by: SPECIALIST

## 2023-02-28 ASSESSMENT — REFRACTION_CURRENTRX
OD_SPHERE: +2.50
OS_SPHERE: +0.75
OS_SPHERE: +2.25
OS_OVR_VA: 20/
OD_AXIS: 180
OD_OVR_VA: 20/
OS_AXIS: 180
OD_CYLINDER: 0.00
OD_CYLINDER: 0.00
OS_CYLINDER: 0.00
OS_CYLINDER: 0.00
OD_OVR_VA: 20/
OD_AXIS: 180
OD_SPHERE: +1.75
OD_SPHERE: +2.25
OS_CYLINDER: 0.00
OS_OVR_VA: 20/
OS_AXIS: 180
OS_SPHERE: +2.25
OS_AXIS: 180
OS_OVR_VA: 20/
OD_AXIS: 113
OD_OVR_VA: 20/
OD_CYLINDER: -0.25

## 2023-02-28 ASSESSMENT — LID EXAM ASSESSMENTS
OS_BLEPHARITIS: LUL T
OD_BLEPHARITIS: RUL T

## 2023-03-02 ASSESSMENT — VISUAL ACUITY
OD_BCVA: 20/70-2
OS_BCVA: 20/25+1

## 2023-03-02 ASSESSMENT — SPHEQUIV_DERIVED
OD_SPHEQUIV: 0.375
OS_SPHEQUIV: -0.125

## 2023-03-02 ASSESSMENT — REFRACTION_AUTOREFRACTION
OS_SPHERE: 0.00
OS_AXIS: 170
OD_CYLINDER: -0.75
OD_SPHERE: +0.75
OS_CYLINDER: -0.25
OD_AXIS: 083

## 2023-05-23 ENCOUNTER — OFFICE (OUTPATIENT)
Dept: URBAN - METROPOLITAN AREA CLINIC 115 | Facility: CLINIC | Age: 88
Setting detail: OPHTHALMOLOGY
End: 2023-05-23
Payer: MEDICARE

## 2023-05-23 DIAGNOSIS — H43.823: ICD-10-CM

## 2023-05-23 DIAGNOSIS — H35.373: ICD-10-CM

## 2023-05-23 DIAGNOSIS — H35.3112: ICD-10-CM

## 2023-05-23 DIAGNOSIS — H35.3221: ICD-10-CM

## 2023-05-23 DIAGNOSIS — H35.033: ICD-10-CM

## 2023-05-23 PROCEDURE — 92134 CPTRZ OPH DX IMG PST SGM RTA: CPT | Performed by: SPECIALIST

## 2023-05-23 PROCEDURE — 67028 INJECTION EYE DRUG: CPT | Performed by: SPECIALIST

## 2023-05-23 ASSESSMENT — REFRACTION_CURRENTRX
OS_AXIS: 180
OD_CYLINDER: -0.25
OD_AXIS: 113
OS_AXIS: 180
OD_OVR_VA: 20/
OD_SPHERE: +2.25
OS_AXIS: 180
OS_OVR_VA: 20/
OD_SPHERE: +2.50
OS_OVR_VA: 20/
OS_SPHERE: +0.75
OD_AXIS: 180
OD_OVR_VA: 20/
OD_SPHERE: +1.75
OD_CYLINDER: 0.00
OD_CYLINDER: 0.00
OD_AXIS: 180
OS_CYLINDER: 0.00
OS_SPHERE: +2.25
OD_OVR_VA: 20/
OS_SPHERE: +2.25
OS_CYLINDER: 0.00
OS_OVR_VA: 20/
OS_CYLINDER: 0.00

## 2023-05-23 ASSESSMENT — LID EXAM ASSESSMENTS
OD_BLEPHARITIS: RUL T
OS_BLEPHARITIS: LUL T

## 2023-05-23 ASSESSMENT — VISUAL ACUITY
OS_BCVA: 20/25
OD_BCVA: 20/50-2

## 2023-05-23 ASSESSMENT — SPHEQUIV_DERIVED
OS_SPHEQUIV: -0.125
OD_SPHEQUIV: 0.375

## 2023-05-23 ASSESSMENT — CONFRONTATIONAL VISUAL FIELD TEST (CVF)
OS_FINDINGS: FULL
OD_FINDINGS: FULL

## 2023-05-23 ASSESSMENT — REFRACTION_AUTOREFRACTION
OD_SPHERE: +0.75
OD_CYLINDER: -0.75
OS_AXIS: 170
OD_AXIS: 083
OS_SPHERE: 0.00
OS_CYLINDER: -0.25

## 2023-05-23 ASSESSMENT — TONOMETRY: OD_IOP_MMHG: 12

## 2023-06-27 ENCOUNTER — OFFICE (OUTPATIENT)
Dept: URBAN - METROPOLITAN AREA CLINIC 115 | Facility: CLINIC | Age: 88
Setting detail: OPHTHALMOLOGY
End: 2023-06-27
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
## 2023-06-27 PROCEDURE — NO SHOW FE NO SHOW FEE: Performed by: SPECIALIST

## 2023-08-22 NOTE — DISCHARGE NOTE ADULT - CARE PLAN
Principal Discharge DX:	Stroke  Goal:	Avoid recurrent symptoms.  Secondary Diagnosis:	Carotid stenosis  Instructions for follow-up, activity and diet:	Vascular surgery follow up.  Secondary Diagnosis:	Hypertension  Instructions for follow-up, activity and diet:	Stop Toprol, continue Amlodipine.  Secondary Diagnosis:	Stented coronary artery  Instructions for follow-up, activity and diet:	Continue medications.  Secondary Diagnosis:	Bradycardia  Instructions for follow-up, activity and diet:	Follow up with cardiology, stop Toprol  Secondary Diagnosis:	Asthma  Instructions for follow-up, activity and diet:	Follow up with pulmonology. Opzelura Counseling:  I discussed with the patient the risks of Opzelura including but not limited to nasopharngitis, bronchitis, ear infection, eosinophila, hives, diarrhea, folliculitis, tonsillitis, and rhinorrhea.  Taken orally, this medication has been linked to serious infections; higher rate of mortality; malignancy and lymphoproliferative disorders; major adverse cardiovascular events; thrombosis; thrombocytopenia, anemia, and neutropenia; and lipid elevations. Principal Discharge DX:	Stroke  Goal:	Avoid recurrent symptoms.  Instructions for follow-up, activity and diet:	Continue Plavix and Amlodipine.  Secondary Diagnosis:	Carotid stenosis  Instructions for follow-up, activity and diet:	Vascular surgery follow up.  Secondary Diagnosis:	Hypertension  Instructions for follow-up, activity and diet:	Stop Toprol, continue Amlodipine.  Secondary Diagnosis:	Stented coronary artery  Instructions for follow-up, activity and diet:	Continue medications.  Secondary Diagnosis:	Bradycardia  Instructions for follow-up, activity and diet:	Follow up with cardiology, stop Toprol  Secondary Diagnosis:	Asthma  Instructions for follow-up, activity and diet:	Follow up with pulmonology.

## 2023-09-26 ENCOUNTER — OFFICE (OUTPATIENT)
Dept: URBAN - METROPOLITAN AREA CLINIC 115 | Facility: CLINIC | Age: 88
Setting detail: OPHTHALMOLOGY
End: 2023-09-26
Payer: MEDICARE

## 2023-09-26 DIAGNOSIS — H35.373: ICD-10-CM

## 2023-09-26 DIAGNOSIS — H35.033: ICD-10-CM

## 2023-09-26 DIAGNOSIS — H35.3112: ICD-10-CM

## 2023-09-26 DIAGNOSIS — H35.3221: ICD-10-CM

## 2023-09-26 DIAGNOSIS — H43.823: ICD-10-CM

## 2023-09-26 PROCEDURE — 67028 INJECTION EYE DRUG: CPT | Performed by: OPHTHALMOLOGY

## 2023-09-26 PROCEDURE — 92134 CPTRZ OPH DX IMG PST SGM RTA: CPT | Performed by: OPHTHALMOLOGY

## 2023-09-26 ASSESSMENT — REFRACTION_CURRENTRX
OS_SPHERE: +0.75
OD_AXIS: 113
OD_SPHERE: +2.50
OD_OVR_VA: 20/
OS_SPHERE: +2.25
OS_AXIS: 180
OS_CYLINDER: 0.00
OS_OVR_VA: 20/
OD_AXIS: 180
OS_OVR_VA: 20/
OS_AXIS: 180
OD_OVR_VA: 20/
OD_AXIS: 180
OS_OVR_VA: 20/
OD_CYLINDER: 0.00
OD_SPHERE: +1.75
OD_CYLINDER: 0.00
OD_CYLINDER: -0.25
OD_SPHERE: +2.25
OS_SPHERE: +2.25
OS_CYLINDER: 0.00
OS_CYLINDER: 0.00
OD_OVR_VA: 20/
OS_AXIS: 180

## 2023-09-26 ASSESSMENT — SPHEQUIV_DERIVED
OD_SPHEQUIV: 0.375
OS_SPHEQUIV: -0.125

## 2023-09-26 ASSESSMENT — LID EXAM ASSESSMENTS
OD_BLEPHARITIS: RUL T
OS_BLEPHARITIS: LUL T

## 2023-09-26 ASSESSMENT — CONFRONTATIONAL VISUAL FIELD TEST (CVF)
OS_FINDINGS: FULL
OD_FINDINGS: FULL

## 2023-09-26 ASSESSMENT — REFRACTION_AUTOREFRACTION
OD_AXIS: 083
OS_SPHERE: 0.00
OD_CYLINDER: -0.75
OS_CYLINDER: -0.25
OS_AXIS: 170
OD_SPHERE: +0.75

## 2023-09-26 ASSESSMENT — VISUAL ACUITY
OS_BCVA: 20/25
OD_BCVA: 20/60-2

## 2023-09-26 ASSESSMENT — TONOMETRY: OS_IOP_MMHG: 10

## 2023-11-28 ENCOUNTER — OFFICE (OUTPATIENT)
Dept: URBAN - METROPOLITAN AREA CLINIC 115 | Facility: CLINIC | Age: 88
Setting detail: OPHTHALMOLOGY
End: 2023-11-28
Payer: MEDICARE

## 2023-11-28 DIAGNOSIS — H35.373: ICD-10-CM

## 2023-11-28 DIAGNOSIS — H35.3221: ICD-10-CM

## 2023-11-28 PROCEDURE — 67028 INJECTION EYE DRUG: CPT | Mod: LT | Performed by: OPHTHALMOLOGY

## 2023-11-28 PROCEDURE — 92134 CPTRZ OPH DX IMG PST SGM RTA: CPT | Performed by: OPHTHALMOLOGY

## 2023-11-28 ASSESSMENT — REFRACTION_CURRENTRX
OS_AXIS: 180
OD_CYLINDER: 0.00
OS_OVR_VA: 20/
OD_CYLINDER: 0.00
OD_OVR_VA: 20/
OD_SPHERE: +1.75
OS_CYLINDER: 0.00
OS_SPHERE: +2.25
OS_SPHERE: +2.25
OS_SPHERE: +0.75
OS_OVR_VA: 20/
OS_OVR_VA: 20/
OS_CYLINDER: 0.00
OS_AXIS: 180
OD_CYLINDER: -0.25
OD_SPHERE: +2.50
OD_OVR_VA: 20/
OD_SPHERE: +2.25
OD_AXIS: 180
OD_OVR_VA: 20/
OD_AXIS: 113
OS_AXIS: 180
OS_CYLINDER: 0.00
OD_AXIS: 180

## 2023-11-28 ASSESSMENT — CONFRONTATIONAL VISUAL FIELD TEST (CVF)
OS_FINDINGS: FULL
OD_FINDINGS: FULL

## 2023-11-28 ASSESSMENT — REFRACTION_AUTOREFRACTION
OD_AXIS: 083
OS_SPHERE: 0.00
OD_SPHERE: +0.75
OS_AXIS: 170
OD_CYLINDER: -0.75
OS_CYLINDER: -0.25

## 2023-11-28 ASSESSMENT — LID EXAM ASSESSMENTS
OS_BLEPHARITIS: LUL T
OD_BLEPHARITIS: RUL T

## 2023-11-28 ASSESSMENT — SPHEQUIV_DERIVED
OD_SPHEQUIV: 0.375
OS_SPHEQUIV: -0.125

## 2024-02-05 ENCOUNTER — OFFICE (OUTPATIENT)
Dept: URBAN - METROPOLITAN AREA CLINIC 115 | Facility: CLINIC | Age: 89
Setting detail: OPHTHALMOLOGY
End: 2024-02-05
Payer: MEDICARE

## 2024-02-05 DIAGNOSIS — H35.3221: ICD-10-CM

## 2024-02-05 DIAGNOSIS — H35.373: ICD-10-CM

## 2024-02-05 PROCEDURE — 92134 CPTRZ OPH DX IMG PST SGM RTA: CPT | Performed by: OPHTHALMOLOGY

## 2024-02-05 PROCEDURE — 67028 INJECTION EYE DRUG: CPT | Mod: LT | Performed by: OPHTHALMOLOGY

## 2024-02-05 ASSESSMENT — REFRACTION_CURRENTRX
OS_SPHERE: +2.25
OS_CYLINDER: 0.00
OD_SPHERE: +2.50
OS_SPHERE: +2.25
OD_CYLINDER: 0.00
OD_OVR_VA: 20/
OS_OVR_VA: 20/
OS_SPHERE: +0.75
OS_AXIS: 180
OS_OVR_VA: 20/
OD_OVR_VA: 20/
OD_SPHERE: +1.75
OD_AXIS: 180
OD_CYLINDER: 0.00
OS_CYLINDER: 0.00
OS_AXIS: 180
OS_AXIS: 180
OD_AXIS: 180
OD_CYLINDER: -0.25
OD_OVR_VA: 20/
OD_SPHERE: +2.25
OD_AXIS: 113
OS_OVR_VA: 20/
OS_CYLINDER: 0.00

## 2024-02-05 ASSESSMENT — SPHEQUIV_DERIVED
OS_SPHEQUIV: -0.125
OD_SPHEQUIV: 0.375

## 2024-02-05 ASSESSMENT — LID EXAM ASSESSMENTS
OS_BLEPHARITIS: LUL T
OD_BLEPHARITIS: RUL T

## 2024-02-05 ASSESSMENT — REFRACTION_AUTOREFRACTION
OD_CYLINDER: -0.75
OS_CYLINDER: -0.25
OS_AXIS: 170
OS_SPHERE: 0.00
OD_AXIS: 083
OD_SPHERE: +0.75

## 2024-02-05 ASSESSMENT — CONFRONTATIONAL VISUAL FIELD TEST (CVF)
OD_FINDINGS: FULL
OS_FINDINGS: FULL

## 2024-05-06 ENCOUNTER — OFFICE (OUTPATIENT)
Dept: URBAN - METROPOLITAN AREA CLINIC 115 | Facility: CLINIC | Age: 89
Setting detail: OPHTHALMOLOGY
End: 2024-05-06
Payer: MEDICARE

## 2024-05-06 DIAGNOSIS — H43.823: ICD-10-CM

## 2024-05-06 DIAGNOSIS — H35.373: ICD-10-CM

## 2024-05-06 DIAGNOSIS — H35.3221: ICD-10-CM

## 2024-05-06 DIAGNOSIS — H35.3114: ICD-10-CM

## 2024-05-06 PROCEDURE — 92202 OPSCPY EXTND ON/MAC DRAW: CPT | Performed by: OPHTHALMOLOGY

## 2024-05-06 PROCEDURE — 67028 INJECTION EYE DRUG: CPT | Mod: LT | Performed by: OPHTHALMOLOGY

## 2024-05-06 PROCEDURE — 92014 COMPRE OPH EXAM EST PT 1/>: CPT | Mod: 25 | Performed by: OPHTHALMOLOGY

## 2024-05-06 PROCEDURE — 92134 CPTRZ OPH DX IMG PST SGM RTA: CPT | Performed by: OPHTHALMOLOGY

## 2024-05-06 ASSESSMENT — LID EXAM ASSESSMENTS
OD_BLEPHARITIS: RUL T
OS_BLEPHARITIS: LUL T

## 2024-05-06 ASSESSMENT — CONFRONTATIONAL VISUAL FIELD TEST (CVF)
OS_FINDINGS: FULL
OD_FINDINGS: FULL

## 2024-05-13 NOTE — H&P ADULT - PROBLEM SELECTOR PLAN 3
Returned call to mother of patient to notify that levalbuterol will be sent in. States understanding, no further questions.    Continue Toprol, monitor HR.

## 2024-08-12 NOTE — ED ADULT NURSE REASSESSMENT NOTE - NIH STROKE SCALE: 6A. MOTOR LEG, LEFT, QM
Rx Refill Note  Requested Prescriptions     Pending Prescriptions Disp Refills    nicotine (NICODERM CQ) 14 MG/24HR patch [Pharmacy Med Name: NICOTINE 14 MG/24HR PATCH] 28 patch 0     Sig: PLACE 1 PATCH ON THE SKIN AS DIRECTED BY PROVIDER DAILY. START AFTER COMPLETING THE 21 MG PATCHES      Last office visit with prescribing clinician: 7/8/2024   Last telemedicine visit with prescribing clinician: 7/17/2024   Next office visit with prescribing clinician: 10/23/2024       Zulay Forrester MA  08/12/24, 17:34 EDT  
(0) No drift; leg holds 30 degree position for full 5 secs

## 2024-08-13 ENCOUNTER — OFFICE (OUTPATIENT)
Dept: URBAN - METROPOLITAN AREA CLINIC 115 | Facility: CLINIC | Age: 89
Setting detail: OPHTHALMOLOGY
End: 2024-08-13
Payer: MEDICARE

## 2024-08-13 DIAGNOSIS — H43.823: ICD-10-CM

## 2024-08-13 DIAGNOSIS — H35.033: ICD-10-CM

## 2024-08-13 DIAGNOSIS — H35.373: ICD-10-CM

## 2024-08-13 DIAGNOSIS — H35.3221: ICD-10-CM

## 2024-08-13 PROCEDURE — 67028 INJECTION EYE DRUG: CPT | Mod: LT | Performed by: OPHTHALMOLOGY

## 2024-08-13 PROCEDURE — 92134 CPTRZ OPH DX IMG PST SGM RTA: CPT | Performed by: OPHTHALMOLOGY

## 2024-08-13 PROCEDURE — 99213 OFFICE O/P EST LOW 20 MIN: CPT | Mod: 25 | Performed by: OPHTHALMOLOGY

## 2024-08-13 ASSESSMENT — LID EXAM ASSESSMENTS
OS_BLEPHARITIS: LUL T
OD_BLEPHARITIS: RUL T

## 2024-08-13 ASSESSMENT — CONFRONTATIONAL VISUAL FIELD TEST (CVF)
OD_FINDINGS: FULL
OS_FINDINGS: FULL

## 2024-08-23 NOTE — H&P ADULT - ENMT
details… detailed exam mouth MEDICATIONS  (STANDING):  paliperidone Injectable, Long Acting 234 milliGRAM(s) IntraMuscular once    MEDICATIONS  (PRN):  melatonin. 3 milliGRAM(s) Oral at bedtime PRN Insomnia  OLANZapine 5 milliGRAM(s) Oral every 8 hours PRN agitation  OLANZapine Injectable 5 milliGRAM(s) IntraMuscular Once PRN agitation   MEDICATIONS  (STANDING):    MEDICATIONS  (PRN):  melatonin. 3 milliGRAM(s) Oral at bedtime PRN Insomnia  OLANZapine 5 milliGRAM(s) Oral every 8 hours PRN agitation  OLANZapine Injectable 5 milliGRAM(s) IntraMuscular Once PRN agitation

## 2024-09-20 ENCOUNTER — OFFICE (OUTPATIENT)
Dept: URBAN - METROPOLITAN AREA CLINIC 115 | Facility: CLINIC | Age: 89
Setting detail: OPHTHALMOLOGY
End: 2024-09-20
Payer: MEDICARE

## 2024-09-20 DIAGNOSIS — H16.223: ICD-10-CM

## 2024-09-20 PROBLEM — H52.7 REFRACTIVE ERROR: Status: ACTIVE | Noted: 2024-09-20

## 2024-09-20 PROCEDURE — 92012 INTRM OPH EXAM EST PATIENT: CPT | Performed by: OPTOMETRIST

## 2024-09-20 ASSESSMENT — LID EXAM ASSESSMENTS
OS_BLEPHARITIS: LUL T
OD_BLEPHARITIS: RUL T

## 2024-09-20 ASSESSMENT — CONFRONTATIONAL VISUAL FIELD TEST (CVF)
OD_FINDINGS: FULL
OS_FINDINGS: FULL

## 2024-12-02 ENCOUNTER — OFFICE (OUTPATIENT)
Dept: URBAN - METROPOLITAN AREA CLINIC 115 | Facility: CLINIC | Age: 89
Setting detail: OPHTHALMOLOGY
End: 2024-12-02
Payer: MEDICARE

## 2024-12-02 DIAGNOSIS — H35.373: ICD-10-CM

## 2024-12-02 DIAGNOSIS — H35.033: ICD-10-CM

## 2024-12-02 DIAGNOSIS — H35.3221: ICD-10-CM

## 2024-12-02 PROCEDURE — 67028 INJECTION EYE DRUG: CPT | Mod: LT | Performed by: OPHTHALMOLOGY

## 2024-12-02 PROCEDURE — 92134 CPTRZ OPH DX IMG PST SGM RTA: CPT | Performed by: OPHTHALMOLOGY

## 2024-12-02 ASSESSMENT — REFRACTION_CURRENTRX
OS_SPHERE: +2.25
OD_SPHERE: +1.75
OS_OVR_VA: 20/
OS_OVR_VA: 20/
OD_OVR_VA: 20/
OD_AXIS: 113
OD_AXIS: 180
OS_CYLINDER: 0.00
OS_SPHERE: +0.75
OS_CYLINDER: 0.00
OS_AXIS: 180
OD_OVR_VA: 20/
OD_SPHERE: +2.50
OS_CYLINDER: 0.00
OS_AXIS: 180
OD_OVR_VA: 20/
OD_CYLINDER: 0.00
OS_OVR_VA: 20/
OD_AXIS: 180
OD_SPHERE: +2.25
OD_CYLINDER: -0.25
OS_SPHERE: +2.25
OD_CYLINDER: 0.00
OS_AXIS: 180

## 2024-12-02 ASSESSMENT — LID EXAM ASSESSMENTS
OD_BLEPHARITIS: RUL T
OS_BLEPHARITIS: LUL T

## 2024-12-02 ASSESSMENT — SUPERFICIAL PUNCTATE KERATITIS (SPK)
OS_SPK: 1+
OD_SPK: 1+

## 2024-12-02 ASSESSMENT — REFRACTION_AUTOREFRACTION
OD_CYLINDER: -0.75
OS_AXIS: 162
OS_SPHERE: 0.00
OD_SPHERE: +0.25
OD_AXIS: 084
OS_CYLINDER: -0.25

## 2024-12-02 ASSESSMENT — TONOMETRY
OD_IOP_MMHG: 13
OS_IOP_MMHG: 11

## 2024-12-02 ASSESSMENT — VISUAL ACUITY
OD_BCVA: 20/80-1
OS_BCVA: 20/25

## 2025-01-03 NOTE — PHYSICAL THERAPY INITIAL EVALUATION ADULT - LEVEL OF INDEPENDENCE: GAIT, REHAB EVAL
[Obese, well nourished, in no acute distress] : obese, well nourished, in no acute distress [de-identified] : TEB visit 200 feet independent no device

## 2025-02-25 ENCOUNTER — EMERGENCY (EMERGENCY)
Facility: HOSPITAL | Age: 89
LOS: 1 days | Discharge: HOME | End: 2025-02-25
Attending: EMERGENCY MEDICINE
Payer: MEDICARE

## 2025-02-25 VITALS
OXYGEN SATURATION: 95 % | HEART RATE: 77 BPM | DIASTOLIC BLOOD PRESSURE: 72 MMHG | RESPIRATION RATE: 19 BRPM | TEMPERATURE: 98 F | SYSTOLIC BLOOD PRESSURE: 129 MMHG

## 2025-02-25 VITALS
TEMPERATURE: 97 F | RESPIRATION RATE: 18 BRPM | HEART RATE: 81 BPM | DIASTOLIC BLOOD PRESSURE: 59 MMHG | OXYGEN SATURATION: 93 % | SYSTOLIC BLOOD PRESSURE: 95 MMHG

## 2025-02-25 DIAGNOSIS — Z98.890 OTHER SPECIFIED POSTPROCEDURAL STATES: Chronic | ICD-10-CM

## 2025-02-25 LAB
ALBUMIN SERPL ELPH-MCNC: 3.4 G/DL — SIGNIFICANT CHANGE UP (ref 3.3–5.2)
ALP SERPL-CCNC: 40 U/L — SIGNIFICANT CHANGE UP (ref 40–120)
ALT FLD-CCNC: 11 U/L — SIGNIFICANT CHANGE UP
ANION GAP SERPL CALC-SCNC: 12 MMOL/L — SIGNIFICANT CHANGE UP (ref 5–17)
APPEARANCE UR: CLEAR — SIGNIFICANT CHANGE UP
APTT BLD: 35 SEC — SIGNIFICANT CHANGE UP (ref 24.5–35.6)
AST SERPL-CCNC: 22 U/L — SIGNIFICANT CHANGE UP
BASOPHILS # BLD AUTO: 0.01 K/UL — SIGNIFICANT CHANGE UP (ref 0–0.2)
BASOPHILS NFR BLD AUTO: 0.2 % — SIGNIFICANT CHANGE UP (ref 0–2)
BILIRUB SERPL-MCNC: 1.4 MG/DL — SIGNIFICANT CHANGE UP (ref 0.4–2)
BILIRUB UR-MCNC: NEGATIVE — SIGNIFICANT CHANGE UP
BLD GP AB SCN SERPL QL: SIGNIFICANT CHANGE UP
BUN SERPL-MCNC: 23.5 MG/DL — HIGH (ref 8–20)
CALCIUM SERPL-MCNC: 8.1 MG/DL — LOW (ref 8.4–10.5)
CHLORIDE SERPL-SCNC: 107 MMOL/L — SIGNIFICANT CHANGE UP (ref 96–108)
CO2 SERPL-SCNC: 23 MMOL/L — SIGNIFICANT CHANGE UP (ref 22–29)
COLOR SPEC: YELLOW — SIGNIFICANT CHANGE UP
CREAT SERPL-MCNC: 1.23 MG/DL — SIGNIFICANT CHANGE UP (ref 0.5–1.3)
DIFF PNL FLD: NEGATIVE — SIGNIFICANT CHANGE UP
EGFR: 55 ML/MIN/1.73M2 — LOW
EOSINOPHIL # BLD AUTO: 0.03 K/UL — SIGNIFICANT CHANGE UP (ref 0–0.5)
EOSINOPHIL NFR BLD AUTO: 0.5 % — SIGNIFICANT CHANGE UP (ref 0–6)
GAS PNL BLDV: SIGNIFICANT CHANGE UP
GLUCOSE SERPL-MCNC: 93 MG/DL — SIGNIFICANT CHANGE UP (ref 70–99)
GLUCOSE UR QL: NEGATIVE MG/DL — SIGNIFICANT CHANGE UP
HCT VFR BLD CALC: 39.6 % — SIGNIFICANT CHANGE UP (ref 39–50)
HGB BLD-MCNC: 13.8 G/DL — SIGNIFICANT CHANGE UP (ref 13–17)
IMM GRANULOCYTES # BLD AUTO: 0.02 K/UL — SIGNIFICANT CHANGE UP (ref 0–0.07)
IMM GRANULOCYTES NFR BLD AUTO: 0.3 % — SIGNIFICANT CHANGE UP (ref 0–0.9)
INR BLD: 1.9 RATIO — HIGH (ref 0.85–1.16)
KETONES UR-MCNC: NEGATIVE MG/DL — SIGNIFICANT CHANGE UP
LEUKOCYTE ESTERASE UR-ACNC: NEGATIVE — SIGNIFICANT CHANGE UP
LIDOCAIN IGE QN: 16 U/L — LOW (ref 22–51)
LYMPHOCYTES # BLD AUTO: 0.55 K/UL — LOW (ref 1–3.3)
LYMPHOCYTES NFR BLD AUTO: 9.6 % — LOW (ref 13–44)
MCHC RBC-ENTMCNC: 34.5 PG — HIGH (ref 27–34)
MCHC RBC-ENTMCNC: 34.8 G/DL — SIGNIFICANT CHANGE UP (ref 32–36)
MCV RBC AUTO: 99 FL — SIGNIFICANT CHANGE UP (ref 80–100)
MONOCYTES # BLD AUTO: 0.6 K/UL — SIGNIFICANT CHANGE UP (ref 0–0.9)
MONOCYTES NFR BLD AUTO: 10.5 % — SIGNIFICANT CHANGE UP (ref 2–14)
NEUTROPHILS # BLD AUTO: 4.53 K/UL — SIGNIFICANT CHANGE UP (ref 1.8–7.4)
NEUTROPHILS NFR BLD AUTO: 78.9 % — HIGH (ref 43–77)
NITRITE UR-MCNC: NEGATIVE — SIGNIFICANT CHANGE UP
NRBC # BLD AUTO: 0 K/UL — SIGNIFICANT CHANGE UP (ref 0–0)
NRBC # FLD: 0 K/UL — SIGNIFICANT CHANGE UP (ref 0–0)
NRBC BLD AUTO-RTO: 0 /100 WBCS — SIGNIFICANT CHANGE UP (ref 0–0)
PH UR: 5.5 — SIGNIFICANT CHANGE UP (ref 5–8)
PLATELET # BLD AUTO: 151 K/UL — SIGNIFICANT CHANGE UP (ref 150–400)
PMV BLD: 10.5 FL — SIGNIFICANT CHANGE UP (ref 7–13)
POTASSIUM SERPL-MCNC: 3.7 MMOL/L — SIGNIFICANT CHANGE UP (ref 3.5–5.3)
POTASSIUM SERPL-SCNC: 3.7 MMOL/L — SIGNIFICANT CHANGE UP (ref 3.5–5.3)
PROT SERPL-MCNC: 6.1 G/DL — LOW (ref 6.6–8.7)
PROT UR-MCNC: SIGNIFICANT CHANGE UP MG/DL
PROTHROM AB SERPL-ACNC: 21.9 SEC — HIGH (ref 9.9–13.4)
RBC # BLD: 4 M/UL — LOW (ref 4.2–5.8)
RBC # FLD: 13.1 % — SIGNIFICANT CHANGE UP (ref 10.3–14.5)
SODIUM SERPL-SCNC: 142 MMOL/L — SIGNIFICANT CHANGE UP (ref 135–145)
SP GR SPEC: >1.03 — HIGH (ref 1–1.03)
TROPONIN T, HIGH SENSITIVITY RESULT: 23 NG/L — SIGNIFICANT CHANGE UP (ref 0–51)
UROBILINOGEN FLD QL: 0.2 MG/DL — SIGNIFICANT CHANGE UP (ref 0.2–1)
WBC # BLD: 5.74 K/UL — SIGNIFICANT CHANGE UP (ref 3.8–10.5)
WBC # FLD AUTO: 5.74 K/UL — SIGNIFICANT CHANGE UP (ref 3.8–10.5)

## 2025-02-25 PROCEDURE — 71045 X-RAY EXAM CHEST 1 VIEW: CPT | Mod: 26

## 2025-02-25 PROCEDURE — 85014 HEMATOCRIT: CPT

## 2025-02-25 PROCEDURE — 83690 ASSAY OF LIPASE: CPT

## 2025-02-25 PROCEDURE — 99285 EMERGENCY DEPT VISIT HI MDM: CPT | Mod: 25

## 2025-02-25 PROCEDURE — 84484 ASSAY OF TROPONIN QUANT: CPT

## 2025-02-25 PROCEDURE — 93005 ELECTROCARDIOGRAM TRACING: CPT

## 2025-02-25 PROCEDURE — 74177 CT ABD & PELVIS W/CONTRAST: CPT | Mod: 26

## 2025-02-25 PROCEDURE — 81003 URINALYSIS AUTO W/O SCOPE: CPT

## 2025-02-25 PROCEDURE — 85730 THROMBOPLASTIN TIME PARTIAL: CPT

## 2025-02-25 PROCEDURE — 86900 BLOOD TYPING SEROLOGIC ABO: CPT

## 2025-02-25 PROCEDURE — 36415 COLL VENOUS BLD VENIPUNCTURE: CPT

## 2025-02-25 PROCEDURE — 74177 CT ABD & PELVIS W/CONTRAST: CPT | Mod: MC

## 2025-02-25 PROCEDURE — 84132 ASSAY OF SERUM POTASSIUM: CPT

## 2025-02-25 PROCEDURE — 86850 RBC ANTIBODY SCREEN: CPT

## 2025-02-25 PROCEDURE — 96374 THER/PROPH/DIAG INJ IV PUSH: CPT | Mod: XU

## 2025-02-25 PROCEDURE — 86901 BLOOD TYPING SEROLOGIC RH(D): CPT

## 2025-02-25 PROCEDURE — 82803 BLOOD GASES ANY COMBINATION: CPT

## 2025-02-25 PROCEDURE — 82947 ASSAY GLUCOSE BLOOD QUANT: CPT

## 2025-02-25 PROCEDURE — 99285 EMERGENCY DEPT VISIT HI MDM: CPT

## 2025-02-25 PROCEDURE — 85018 HEMOGLOBIN: CPT

## 2025-02-25 PROCEDURE — 83605 ASSAY OF LACTIC ACID: CPT

## 2025-02-25 PROCEDURE — 85610 PROTHROMBIN TIME: CPT

## 2025-02-25 PROCEDURE — 82330 ASSAY OF CALCIUM: CPT

## 2025-02-25 PROCEDURE — 82435 ASSAY OF BLOOD CHLORIDE: CPT

## 2025-02-25 PROCEDURE — 80053 COMPREHEN METABOLIC PANEL: CPT

## 2025-02-25 PROCEDURE — 85025 COMPLETE CBC W/AUTO DIFF WBC: CPT

## 2025-02-25 PROCEDURE — 71045 X-RAY EXAM CHEST 1 VIEW: CPT

## 2025-02-25 PROCEDURE — 93010 ELECTROCARDIOGRAM REPORT: CPT

## 2025-02-25 PROCEDURE — 84295 ASSAY OF SERUM SODIUM: CPT

## 2025-02-25 PROCEDURE — 87086 URINE CULTURE/COLONY COUNT: CPT

## 2025-02-25 RX ORDER — APIXABAN 2.5 MG/1
5 TABLET, FILM COATED ORAL ONCE
Refills: 0 | Status: DISCONTINUED | OUTPATIENT
Start: 2025-02-25 | End: 2025-03-04

## 2025-02-25 RX ORDER — ONDANSETRON HCL/PF 4 MG/2 ML
4 VIAL (ML) INJECTION ONCE
Refills: 0 | Status: COMPLETED | OUTPATIENT
Start: 2025-02-25 | End: 2025-02-25

## 2025-02-25 RX ORDER — ONDANSETRON HCL/PF 4 MG/2 ML
1 VIAL (ML) INJECTION
Qty: 1 | Refills: 0
Start: 2025-02-25

## 2025-02-25 RX ADMIN — Medication 4 MILLIGRAM(S): at 10:54

## 2025-02-25 RX ADMIN — Medication 1000 MILLILITER(S): at 10:54

## 2025-02-25 NOTE — ED PROVIDER NOTE - PHYSICAL EXAMINATION
Const: Awake, alert and oriented. In no acute distress. Well appearing.  HEENT: NC/AT. Moist mucous membranes.  Eyes: No scleral icterus. EOMI.  Neck:. Soft and supple. Full ROM without pain.  Cardiac: Irregularly irregular rate & rhythm. +S1/S2. No murmurs. Peripheral pulses 2+ and symmetric. No LE edema.  Resp: Speaking in full sentences. No evidence of respiratory distress. No wheezes, rales or rhonchi.  Abd: Soft, non-tender, non-distended. Normal bowel sounds in all 4 quadrants. No guarding or rebound.  Back: Spine midline and non-tender. No CVAT.  Skin: No rashes, abrasions or lacerations.  Neuro: Awake, alert & oriented x 3. Moves all extremities symmetrically.

## 2025-02-25 NOTE — ED ADULT NURSE NOTE - EXTENSIONS OF SELF_ADULT
Stable but having side effects due to the high spikes after the IM injections.  SQ injections and oral meds not covered by insurance.  Will change  To weekly dose at lower dose   
None

## 2025-02-25 NOTE — ED ADULT NURSE NOTE - PAIN: PRESENCE, MLM
CONST: no fevers, no chills, +fatigue  EYES: no pain, no vision changes  ENT: no sore throat, no ear pain, no change in hearing  CV: no chest pain, no leg swelling  RESP: no shortness of breath, no cough  ABD: no abdominal pain, no nausea, no vomiting, no diarrhea  : no dysuria, no flank pain, no hematuria  MSK: no back pain, no extremity pain  NEURO: no headache or additional neurologic complaints  HEME: no easy bleeding  SKIN:  no rash
complains of pain/discomfort

## 2025-02-25 NOTE — ED PROVIDER NOTE - CLINICAL SUMMARY MEDICAL DECISION MAKING FREE TEXT BOX
89-year-old male with past medical history A-fib on Eliquis, CAD, hypertension presents for 4 days of nausea and vomiting with any kind of p.o. intake.  He notes nonspecific abdominal pain, has had normal bowel movements, no blood.  Abdomen soft and grossly nontender, EKG without ischemia, rate controlled A-fib.  Will monitor on telemetry, check labs including troponin, CT abdomen pelvis due to age and GI symptoms, fluids and Zofran for nausea.    Progress: Labs unremarkable, CT without discernible etiology for nausea and vomiting.  Blood pressure improved with IV fluids. 89-year-old male with past medical history A-fib on Eliquis, CAD, hypertension presents for 4 days of nausea and vomiting with any kind of p.o. intake.  He notes nonspecific abdominal pain, has had normal bowel movements, no blood.  Abdomen soft and grossly nontender, EKG without ischemia, rate controlled A-fib.  Will monitor on telemetry, check labs including troponin, CT abdomen pelvis due to age and GI symptoms, fluids and Zofran for nausea.    Progress: Labs unremarkable, CT without discernible etiology for nausea and vomiting.  Blood pressure improved with IV fluids. Troponin flat. Patient tolerated PO in the ED, UA negative for acute infection. Patient and wife comfortable with discharge at this time. Rx for Zofran ODT sent to pharmacy. Strict return precautions discussed.

## 2025-02-25 NOTE — ED PROVIDER NOTE - NSICDXPASTMEDICALHX_GEN_ALL_CORE_FT
PAST MEDICAL HISTORY:  Asthma     CAD (coronary artery disease)     Chronic atrial fibrillation on Eliquis    Hypertension     Stented coronary artery x3

## 2025-02-25 NOTE — ED PROVIDER NOTE - PATIENT PORTAL LINK FT
You can access the FollowMyHealth Patient Portal offered by [facility] by registering at the following website: [email or web address]. By joining [employer] [facility]’s FollowMyHealth portal, you will also be able to view your health information using other applications (apps) compatible with our system.

## 2025-02-25 NOTE — ED PROVIDER NOTE - OBJECTIVE STATEMENT
89 y/o M with PMH A fib on Eliquis, asthma, CAD, hypertension presents for 4 days of nausea/vomiting and diffuse, non-specific abdominal pain. He notes normal bowel movements, has no appetite, no fevers. He denies blood in the vomitus or stool. He did not take his Eliquis today for fear of vomiting. He denies similar symptoms in the past. He denies sick contacts. Notes normal urination.

## 2025-02-25 NOTE — ED ADULT NURSE NOTE - OBJECTIVE STATEMENT
A&O x 4 c/o N/V x 3 days. Abdomen soft and tender to touch. Nausea with no vomiting in ED. No diarrhea. IV access obtained; pt medicated a prescribed. Bed locked and in lowest position. Call bell within reach. Able ot make needs known.

## 2025-02-25 NOTE — ED ADULT TRIAGE NOTE - CHIEF COMPLAINT QUOTE
Presents to ED c/o abdominal pain, N/V. Pt states that 2-3 days ago he began to feel sick and has been vomitting. C/o decreased PO intake and reports that he has not been able to take his medications because he has not been eating.

## 2025-02-26 LAB
CULTURE RESULTS: NO GROWTH — SIGNIFICANT CHANGE UP
SPECIMEN SOURCE: SIGNIFICANT CHANGE UP

## 2025-02-28 DIAGNOSIS — I10 ESSENTIAL (PRIMARY) HYPERTENSION: ICD-10-CM

## 2025-02-28 DIAGNOSIS — R11.2 NAUSEA WITH VOMITING, UNSPECIFIED: ICD-10-CM

## 2025-02-28 DIAGNOSIS — Z79.01 LONG TERM (CURRENT) USE OF ANTICOAGULANTS: ICD-10-CM

## 2025-02-28 DIAGNOSIS — I25.10 ATHEROSCLEROTIC HEART DISEASE OF NATIVE CORONARY ARTERY WITHOUT ANGINA PECTORIS: ICD-10-CM

## 2025-02-28 DIAGNOSIS — J45.909 UNSPECIFIED ASTHMA, UNCOMPLICATED: ICD-10-CM

## 2025-02-28 DIAGNOSIS — I48.91 UNSPECIFIED ATRIAL FIBRILLATION: ICD-10-CM

## 2025-03-25 ENCOUNTER — OFFICE (OUTPATIENT)
Dept: URBAN - METROPOLITAN AREA CLINIC 115 | Facility: CLINIC | Age: OVER 89
Setting detail: OPHTHALMOLOGY
End: 2025-03-25
Payer: MEDICARE

## 2025-03-25 DIAGNOSIS — H35.373: ICD-10-CM

## 2025-03-25 DIAGNOSIS — H35.3221: ICD-10-CM

## 2025-03-25 DIAGNOSIS — H43.823: ICD-10-CM

## 2025-03-25 PROCEDURE — 92134 CPTRZ OPH DX IMG PST SGM RTA: CPT | Performed by: OPHTHALMOLOGY

## 2025-03-25 PROCEDURE — 67028 INJECTION EYE DRUG: CPT | Mod: LT | Performed by: OPHTHALMOLOGY

## 2025-03-25 PROCEDURE — 92014 COMPRE OPH EXAM EST PT 1/>: CPT | Mod: 25 | Performed by: OPHTHALMOLOGY

## 2025-03-25 ASSESSMENT — SUPERFICIAL PUNCTATE KERATITIS (SPK)
OD_SPK: 1+
OS_SPK: 1+

## 2025-03-25 ASSESSMENT — REFRACTION_AUTOREFRACTION
OS_SPHERE: 0.00
OD_CYLINDER: -0.75
OS_CYLINDER: -0.25
OD_SPHERE: +0.25
OD_AXIS: 084
OS_AXIS: 162

## 2025-03-25 ASSESSMENT — REFRACTION_CURRENTRX
OS_OVR_VA: 20/
OS_SPHERE: +0.75
OD_OVR_VA: 20/
OS_SPHERE: +2.25
OD_SPHERE: +1.75
OS_CYLINDER: 0.00
OD_OVR_VA: 20/
OS_AXIS: 180
OS_AXIS: 180
OS_CYLINDER: 0.00
OD_AXIS: 180
OD_AXIS: 180
OS_SPHERE: +2.25
OS_OVR_VA: 20/
OD_CYLINDER: 0.00
OS_CYLINDER: 0.00
OD_SPHERE: +2.50
OS_OVR_VA: 20/
OD_SPHERE: +2.25
OD_CYLINDER: -0.25
OD_AXIS: 113
OD_OVR_VA: 20/
OS_AXIS: 180
OD_CYLINDER: 0.00

## 2025-03-25 ASSESSMENT — CONFRONTATIONAL VISUAL FIELD TEST (CVF)
OD_FINDINGS: FULL
OS_FINDINGS: FULL

## 2025-03-25 ASSESSMENT — TONOMETRY
OD_IOP_MMHG: 13
OS_IOP_MMHG: 14

## 2025-03-25 ASSESSMENT — LID EXAM ASSESSMENTS
OD_BLEPHARITIS: RUL T
OS_BLEPHARITIS: LUL T

## 2025-03-25 ASSESSMENT — VISUAL ACUITY
OD_BCVA: 20/80-1
OS_BCVA: 20/25

## 2025-07-17 ENCOUNTER — OFFICE (OUTPATIENT)
Dept: URBAN - METROPOLITAN AREA CLINIC 115 | Facility: CLINIC | Age: OVER 89
Setting detail: OPHTHALMOLOGY
End: 2025-07-17
Payer: MEDICARE

## 2025-07-17 DIAGNOSIS — H35.033: ICD-10-CM

## 2025-07-17 DIAGNOSIS — H35.3221: ICD-10-CM

## 2025-07-17 DIAGNOSIS — H35.373: ICD-10-CM

## 2025-07-17 PROCEDURE — 92134 CPTRZ OPH DX IMG PST SGM RTA: CPT | Performed by: OPHTHALMOLOGY

## 2025-07-17 PROCEDURE — 67028 INJECTION EYE DRUG: CPT | Mod: LT | Performed by: OPHTHALMOLOGY

## 2025-07-17 ASSESSMENT — LID EXAM ASSESSMENTS
OS_BLEPHARITIS: LUL T
OD_BLEPHARITIS: RUL T

## 2025-07-17 ASSESSMENT — REFRACTION_AUTOREFRACTION
OS_AXIS: 162
OS_SPHERE: 0.00
OD_SPHERE: +0.25
OS_CYLINDER: -0.25
OD_AXIS: 084
OD_CYLINDER: -0.75

## 2025-07-17 ASSESSMENT — REFRACTION_CURRENTRX
OD_AXIS: 180
OS_SPHERE: +0.75
OD_CYLINDER: 0.00
OS_SPHERE: +2.25
OD_SPHERE: +1.75
OD_AXIS: 180
OD_OVR_VA: 20/
OS_CYLINDER: 0.00
OS_OVR_VA: 20/
OD_AXIS: 113
OD_SPHERE: +2.50
OS_SPHERE: +2.25
OS_CYLINDER: 0.00
OS_OVR_VA: 20/
OS_CYLINDER: 0.00
OD_OVR_VA: 20/
OS_AXIS: 180
OD_CYLINDER: -0.25
OS_AXIS: 180
OS_AXIS: 180
OS_OVR_VA: 20/
OD_CYLINDER: 0.00
OD_SPHERE: +2.25
OD_OVR_VA: 20/

## 2025-07-17 ASSESSMENT — SUPERFICIAL PUNCTATE KERATITIS (SPK)
OD_SPK: 1+
OS_SPK: 1+

## 2025-07-17 ASSESSMENT — VISUAL ACUITY
OD_BCVA: 20/100-1
OS_BCVA: 20/30

## 2025-07-17 ASSESSMENT — CONFRONTATIONAL VISUAL FIELD TEST (CVF)
OS_FINDINGS: FULL
OD_FINDINGS: FULL